# Patient Record
Sex: FEMALE | Race: WHITE | NOT HISPANIC OR LATINO | Employment: OTHER | URBAN - NONMETROPOLITAN AREA
[De-identification: names, ages, dates, MRNs, and addresses within clinical notes are randomized per-mention and may not be internally consistent; named-entity substitution may affect disease eponyms.]

---

## 2017-03-13 RX ORDER — ESCITALOPRAM OXALATE 10 MG/1
10 TABLET ORAL DAILY
Qty: 30 TABLET | Refills: 11 | Status: SHIPPED | OUTPATIENT
Start: 2017-03-13 | End: 2018-03-29 | Stop reason: ALTCHOICE

## 2017-08-02 RX ORDER — PERMETHRIN 50 MG/G
CREAM TOPICAL
Qty: 1 EACH | Refills: 0 | Status: SHIPPED | OUTPATIENT
Start: 2017-08-02 | End: 2018-03-29 | Stop reason: ALTCHOICE

## 2017-08-02 RX ORDER — HYDROXYZINE PAMOATE 25 MG/1
25 CAPSULE ORAL EVERY 6 HOURS PRN
Qty: 60 CAPSULE | Refills: 0 | Status: SHIPPED | OUTPATIENT
Start: 2017-08-02 | End: 2017-08-13 | Stop reason: SDUPTHER

## 2017-08-14 RX ORDER — HYDROXYZINE PAMOATE 25 MG/1
CAPSULE ORAL
Qty: 60 CAPSULE | Refills: 11 | Status: SHIPPED | OUTPATIENT
Start: 2017-08-14 | End: 2018-03-29 | Stop reason: ALTCHOICE

## 2018-03-29 RX ORDER — VERAPAMIL HYDROCHLORIDE 120 MG/1
120 TABLET, FILM COATED ORAL DAILY
COMMUNITY
End: 2022-02-10 | Stop reason: SDUPTHER

## 2018-03-29 RX ORDER — ERGOCALCIFEROL 1.25 MG/1
50000 CAPSULE ORAL
COMMUNITY

## 2018-04-03 ENCOUNTER — ANESTHESIA (OUTPATIENT)
Dept: GASTROENTEROLOGY | Facility: HOSPITAL | Age: 63
End: 2018-04-03

## 2018-04-03 ENCOUNTER — ANESTHESIA EVENT (OUTPATIENT)
Dept: GASTROENTEROLOGY | Facility: HOSPITAL | Age: 63
End: 2018-04-03

## 2018-04-03 ENCOUNTER — HOSPITAL ENCOUNTER (OUTPATIENT)
Facility: HOSPITAL | Age: 63
Setting detail: HOSPITAL OUTPATIENT SURGERY
Discharge: HOME | End: 2018-04-03
Attending: INTERNAL MEDICINE | Admitting: INTERNAL MEDICINE

## 2018-04-03 VITALS
WEIGHT: 154 LBS | DIASTOLIC BLOOD PRESSURE: 70 MMHG | SYSTOLIC BLOOD PRESSURE: 132 MMHG | HEART RATE: 61 BPM | OXYGEN SATURATION: 98 % | BODY MASS INDEX: 26.29 KG/M2 | HEIGHT: 64 IN | RESPIRATION RATE: 18 BRPM | TEMPERATURE: 97.3 F

## 2018-04-03 DIAGNOSIS — Z12.11 ENCOUNTER FOR SCREENING COLONOSCOPY: ICD-10-CM

## 2018-04-03 PROCEDURE — 25010000002 PROPOFOL 10 MG/ML EMULSION: Performed by: NURSE ANESTHETIST, CERTIFIED REGISTERED

## 2018-04-03 PROCEDURE — 88305 TISSUE EXAM BY PATHOLOGIST: CPT | Performed by: PATHOLOGY

## 2018-04-03 PROCEDURE — 88305 TISSUE EXAM BY PATHOLOGIST: CPT | Performed by: INTERNAL MEDICINE

## 2018-04-03 RX ORDER — PROPOFOL 10 MG/ML
VIAL (ML) INTRAVENOUS AS NEEDED
Status: DISCONTINUED | OUTPATIENT
Start: 2018-04-03 | End: 2018-04-03 | Stop reason: SURG

## 2018-04-03 RX ORDER — LIDOCAINE HYDROCHLORIDE 10 MG/ML
INJECTION, SOLUTION INFILTRATION; PERINEURAL AS NEEDED
Status: DISCONTINUED | OUTPATIENT
Start: 2018-04-03 | End: 2018-04-03 | Stop reason: SURG

## 2018-04-03 RX ORDER — ONDANSETRON 2 MG/ML
4 INJECTION INTRAMUSCULAR; INTRAVENOUS ONCE AS NEEDED
Status: DISCONTINUED | OUTPATIENT
Start: 2018-04-03 | End: 2018-04-03 | Stop reason: HOSPADM

## 2018-04-03 RX ORDER — PROMETHAZINE HYDROCHLORIDE 25 MG/1
25 SUPPOSITORY RECTAL ONCE AS NEEDED
Status: DISCONTINUED | OUTPATIENT
Start: 2018-04-03 | End: 2018-04-03 | Stop reason: HOSPADM

## 2018-04-03 RX ORDER — PROMETHAZINE HYDROCHLORIDE 25 MG/1
25 TABLET ORAL ONCE AS NEEDED
Status: DISCONTINUED | OUTPATIENT
Start: 2018-04-03 | End: 2018-04-03 | Stop reason: HOSPADM

## 2018-04-03 RX ORDER — PROMETHAZINE HYDROCHLORIDE 25 MG/ML
12.5 INJECTION, SOLUTION INTRAMUSCULAR; INTRAVENOUS ONCE AS NEEDED
Status: DISCONTINUED | OUTPATIENT
Start: 2018-04-03 | End: 2018-04-03 | Stop reason: HOSPADM

## 2018-04-03 RX ORDER — DEXTROSE AND SODIUM CHLORIDE 5; .45 G/100ML; G/100ML
20 INJECTION, SOLUTION INTRAVENOUS CONTINUOUS
Status: DISCONTINUED | OUTPATIENT
Start: 2018-04-03 | End: 2018-04-03 | Stop reason: HOSPADM

## 2018-04-03 RX ADMIN — DEXTROSE AND SODIUM CHLORIDE 20 ML/HR: 5; 450 INJECTION, SOLUTION INTRAVENOUS at 10:30

## 2018-04-03 RX ADMIN — PROPOFOL 20 MG: 10 INJECTION, EMULSION INTRAVENOUS at 12:15

## 2018-04-03 RX ADMIN — LIDOCAINE HYDROCHLORIDE 50 MG: 10 INJECTION, SOLUTION INFILTRATION; PERINEURAL at 12:05

## 2018-04-03 RX ADMIN — PROPOFOL 30 MG: 10 INJECTION, EMULSION INTRAVENOUS at 12:10

## 2018-04-03 RX ADMIN — PROPOFOL 70 MG: 10 INJECTION, EMULSION INTRAVENOUS at 12:05

## 2018-04-03 RX ADMIN — PROPOFOL 30 MG: 10 INJECTION, EMULSION INTRAVENOUS at 12:08

## 2018-04-03 NOTE — ANESTHESIA POSTPROCEDURE EVALUATION
Patient: Anna Carvalho    Procedure Summary     Date:  04/03/18 Room / Location:  [facility] ENDOSCOPY 2 / [facility] ENDOSCOPY    Anesthesia Start:  1158 Anesthesia Stop:  1223    Procedure:  COLONOSCOPY (N/A ) Diagnosis:       Encounter for screening colonoscopy      (Encounter for screening colonoscopy [Z12.11])    Surgeon:  Jean Morales DO Provider:  Williams Estevez CRNA    Anesthesia Type:  MAC ASA Status:  2          Anesthesia Type: MAC  Last vitals  BP   132/72 (04/03/18 1011)   Temp   97.4 °F (36.3 °C) (04/03/18 1011)   Pulse   75 (04/03/18 1011)   Resp   18 (04/03/18 1011)     SpO2   98 % (04/03/18 1011)     Post Anesthesia Care and Evaluation    Patient location during evaluation: PACU  Patient participation: complete - patient participated  Level of consciousness: awake and alert  Pain score: 1  Pain management: adequate  Airway patency: patent  Anesthetic complications: No anesthetic complications  PONV Status: none  Cardiovascular status: acceptable  Respiratory status: acceptable  Hydration status: acceptable

## 2018-04-03 NOTE — H&P
Adalgisa Nath DO,[facility]  Gastroenterology  Hepatology  Endoscopy  Board Certified in Internal Medicine and gastroenterology  [address], [address]  [place]. [address]  - [phone]   F - [phone]     GASTROENTEROLOGY HISTORY AND PHYSICAL  NOTE   ADALGISA NATH DO.         SUBJECTIVE:   4/3/2018    Name: Anna Carvalho  DOD: 1955      Chief Complaint:       Subjective : Screening examination for routine risk for colon cancer    Patient is 62 y.o. female presents with desire for elective colonoscopy.      ROS/HISTORY/ CURRENT MEDICATIONS/OBJECTIVE/VS/PE:   Review of Systems:   Review of Systems    History:     Past Medical History:   Diagnosis Date   • Migraine    • Sleep apnea      Past Surgical History:   Procedure Laterality Date   • APPENDECTOMY     • CHOLECYSTECTOMY     • HYSTERECTOMY       History reviewed. No pertinent family history.  Social History   Substance Use Topics   • Smoking status: Current Some Day Smoker     Packs/day: 1.00   • Smokeless tobacco: Never Used   • Alcohol use No     Prescriptions Prior to Admission   Medication Sig Dispense Refill Last Dose   • verapamil (CALAN) 120 MG tablet Take 120 mg by mouth Daily.   4/3/2018 at Unknown time   • vitamin D (ERGOCALCIFEROL) 07911 units capsule capsule Take 50,000 Units by mouth Every 14 (Fourteen) Days.   3/18/2018     Allergies:  Biaxin [clarithromycin]; Penicillins; Ceclor [cefaclor]; Keflex [cephalexin]; Other; and Reglan [metoclopramide]    I have reviewed the patients medical history, surgical history and family history in the available medical record system.     Current Medications:     Current Facility-Administered Medications   Medication Dose Route Frequency Provider Last Rate Last Dose   • dextrose 5 % and sodium chloride 0.45 % infusion  20 mL/hr Intravenous Continuous Adalgisa Nath DO 20 mL/hr at 04/03/18 1030 20 mL/hr at 04/03/18 1030       Objective     Physical Exam:   Temp:  [97.4 °F (36.3  °C)] 97.4 °F (36.3 °C)  Heart Rate:  [75] 75  Resp:  [18] 18  BP: (132)/(72) 132/72    Physical Exam:  General Appearance:    Alert, cooperative, in no acute distress   Head:    Normocephalic, without obvious abnormality, atraumatic   Eyes:            Lids and lashes normal, conjunctivae and sclerae normal, no   icterus, no pallor, corneas clear, PERRLA   Ears:    Ears appear intact with no abnormalities noted   Throat:   No oral lesions, no thrush, oral mucosa moist   Neck:   No adenopathy, supple, trachea midline, no thyromegaly, no     carotid bruit, no JVD   Back:     No kyphosis present, no scoliosis present, no skin lesions,       erythema or scars, no tenderness to percussion or                   palpation,   range of motion normal   Lungs:     Clear to auscultation,respirations regular, even and                   unlabored    Heart:    Regular rhythm and normal rate, normal S1 and S2, no            murmur, no gallop, no rub, no click   Breast Exam:    Deferred   Abdomen:     Normal bowel sounds, no masses, no organomegaly, soft        non-tender, non-distended, no guarding, no rebound                 tenderness   Genitalia:    Deferred   Extremities:   Moves all extremities well, no edema, no cyanosis, no              redness   Pulses:   Pulses palpable and equal bilaterally   Skin:   No bleeding, bruising or rash   Lymph nodes:   No palpable adenopathy   Neurologic:   Cranial nerves 2 - 12 grossly intact, sensation intact, DTR        present and equal bilaterally      Results Review:     No results found for: WBC, HGB, HCT, PLT          No results found for: LIPASE  No results found for: INR       Radiology Review:  Imaging Results (last 72 hours)     ** No results found for the last 72 hours. **           I reviewed the patient's new clinical results.  I reviewed the patient's new imaging results and agree with the interpretation.     ASSESSMENT/PLAN:   ASSESSMENT:   1.  Screening examination for routine  risk for colon cancer    PLAN:   1.  Colonoscopy    Risk and benefits associated with procedure are reviewed with the patient.  The patient wishes to proceed      Jean Morales DO  04/03/18  11:47 AM

## 2018-04-03 NOTE — ANESTHESIA PREPROCEDURE EVALUATION
Anesthesia Evaluation     NPO Solid Status: > 8 hours  NPO Liquid Status: > 8 hours           Airway   No difficulty expected  Dental      Pulmonary - normal exam   (+) a smoker Current Smoked day of surgery, COPD mild, sleep apnea,   Cardiovascular - negative cardio ROS and normal exam        Neuro/Psych  (+) headaches,     GI/Hepatic/Renal/Endo - negative ROS     Musculoskeletal (-) negative ROS    Abdominal  - normal exam   Substance History - negative use     OB/GYN negative ob/gyn ROS         Other - negative ROS                     Anesthesia Plan    ASA 2     MAC     intravenous induction   Anesthetic plan and risks discussed with patient.    Plan discussed with CRNA.

## 2018-04-04 LAB
LAB AP CASE REPORT: NORMAL
Lab: NORMAL
PATH REPORT.FINAL DX SPEC: NORMAL
PATH REPORT.GROSS SPEC: NORMAL

## 2018-04-27 ENCOUNTER — HOSPITAL ENCOUNTER (EMERGENCY)
Facility: HOSPITAL | Age: 63
Discharge: HOME | End: 2018-04-27
Attending: EMERGENCY MEDICINE | Admitting: EMERGENCY MEDICINE

## 2018-04-27 ENCOUNTER — APPOINTMENT (OUTPATIENT)
Dept: CT IMAGING | Facility: HOSPITAL | Age: 63
End: 2018-04-27

## 2018-04-27 VITALS
TEMPERATURE: 98 F | WEIGHT: 157 LBS | BODY MASS INDEX: 26.8 KG/M2 | HEART RATE: 64 BPM | DIASTOLIC BLOOD PRESSURE: 62 MMHG | SYSTOLIC BLOOD PRESSURE: 125 MMHG | HEIGHT: 64 IN | RESPIRATION RATE: 18 BRPM | OXYGEN SATURATION: 96 %

## 2018-04-27 DIAGNOSIS — R10.9 FLANK PAIN, ACUTE: Primary | ICD-10-CM

## 2018-04-27 LAB
ALBUMIN SERPL-MCNC: 4.1 G/DL (ref 3.4–4.8)
ALBUMIN/GLOB SERPL: 1.4 G/DL (ref 1.1–1.8)
ALP SERPL-CCNC: 107 U/L (ref 38–126)
ALT SERPL W P-5'-P-CCNC: 21 U/L (ref 9–52)
ANION GAP SERPL CALCULATED.3IONS-SCNC: 11 MMOL/L (ref 5–15)
AST SERPL-CCNC: 15 U/L (ref 14–36)
BASOPHILS # BLD AUTO: 0.03 10*3/MM3 (ref 0–0.2)
BASOPHILS NFR BLD AUTO: 0.3 % (ref 0–2)
BILIRUB SERPL-MCNC: 0.5 MG/DL (ref 0.2–1.3)
BILIRUB UR QL STRIP: NEGATIVE
BUN BLD-MCNC: 10 MG/DL (ref 7–21)
BUN/CREAT SERPL: 13.3 (ref 7–25)
CALCIUM SPEC-SCNC: 9 MG/DL (ref 8.4–10.2)
CHLORIDE SERPL-SCNC: 104 MMOL/L (ref 95–110)
CLARITY UR: CLEAR
CO2 SERPL-SCNC: 24 MMOL/L (ref 22–31)
COLOR UR: YELLOW
CREAT BLD-MCNC: 0.75 MG/DL (ref 0.5–1)
DEPRECATED RDW RBC AUTO: 42.4 FL (ref 36.4–46.3)
EOSINOPHIL # BLD AUTO: 0.07 10*3/MM3 (ref 0–0.7)
EOSINOPHIL NFR BLD AUTO: 0.7 % (ref 0–7)
ERYTHROCYTE [DISTWIDTH] IN BLOOD BY AUTOMATED COUNT: 13.4 % (ref 11.5–14.5)
GFR SERPL CREATININE-BSD FRML MDRD: 79 ML/MIN/1.73 (ref 45–104)
GLOBULIN UR ELPH-MCNC: 3 GM/DL (ref 2.3–3.5)
GLUCOSE BLD-MCNC: 89 MG/DL (ref 60–100)
GLUCOSE UR STRIP-MCNC: NEGATIVE MG/DL
HCT VFR BLD AUTO: 44.9 % (ref 35–45)
HGB BLD-MCNC: 15.5 G/DL (ref 12–15.5)
HGB UR QL STRIP.AUTO: NEGATIVE
HOLD SPECIMEN: NORMAL
HOLD SPECIMEN: NORMAL
IMM GRANULOCYTES # BLD: 0.03 10*3/MM3 (ref 0–0.02)
IMM GRANULOCYTES NFR BLD: 0.3 % (ref 0–0.5)
KETONES UR QL STRIP: NEGATIVE
LEUKOCYTE ESTERASE UR QL STRIP.AUTO: NEGATIVE
LIPASE SERPL-CCNC: 84 U/L (ref 23–300)
LYMPHOCYTES # BLD AUTO: 3.56 10*3/MM3 (ref 0.6–4.2)
LYMPHOCYTES NFR BLD AUTO: 34.5 % (ref 10–50)
MCH RBC QN AUTO: 29.9 PG (ref 26.5–34)
MCHC RBC AUTO-ENTMCNC: 34.5 G/DL (ref 31.4–36)
MCV RBC AUTO: 86.5 FL (ref 80–98)
MONOCYTES # BLD AUTO: 0.74 10*3/MM3 (ref 0–0.9)
MONOCYTES NFR BLD AUTO: 7.2 % (ref 0–12)
NEUTROPHILS # BLD AUTO: 5.88 10*3/MM3 (ref 2–8.6)
NEUTROPHILS NFR BLD AUTO: 57 % (ref 37–80)
NITRITE UR QL STRIP: NEGATIVE
PH UR STRIP.AUTO: 6.5 [PH] (ref 5–9)
PLATELET # BLD AUTO: 232 10*3/MM3 (ref 150–450)
PMV BLD AUTO: 11.7 FL (ref 8–12)
POTASSIUM BLD-SCNC: 3.7 MMOL/L (ref 3.5–5.1)
PROT SERPL-MCNC: 7.1 G/DL (ref 6.3–8.6)
PROT UR QL STRIP: NEGATIVE
RBC # BLD AUTO: 5.19 10*6/MM3 (ref 3.77–5.16)
SODIUM BLD-SCNC: 139 MMOL/L (ref 137–145)
SP GR UR STRIP: 1.01 (ref 1–1.03)
UROBILINOGEN UR QL STRIP: NORMAL
WBC NRBC COR # BLD: 10.31 10*3/MM3 (ref 3.2–9.8)
WHOLE BLOOD HOLD SPECIMEN: NORMAL
WHOLE BLOOD HOLD SPECIMEN: NORMAL

## 2018-04-27 PROCEDURE — 74176 CT ABD & PELVIS W/O CONTRAST: CPT

## 2018-04-27 PROCEDURE — 96374 THER/PROPH/DIAG INJ IV PUSH: CPT

## 2018-04-27 PROCEDURE — 96361 HYDRATE IV INFUSION ADD-ON: CPT

## 2018-04-27 PROCEDURE — 83690 ASSAY OF LIPASE: CPT

## 2018-04-27 PROCEDURE — 25010000002 MORPHINE PER 10 MG: Performed by: EMERGENCY MEDICINE

## 2018-04-27 PROCEDURE — 25010000002 KETOROLAC TROMETHAMINE PER 15 MG: Performed by: EMERGENCY MEDICINE

## 2018-04-27 PROCEDURE — 99284 EMERGENCY DEPT VISIT MOD MDM: CPT

## 2018-04-27 PROCEDURE — 25010000002 ONDANSETRON PER 1 MG: Performed by: EMERGENCY MEDICINE

## 2018-04-27 PROCEDURE — 85025 COMPLETE CBC W/AUTO DIFF WBC: CPT

## 2018-04-27 PROCEDURE — 96375 TX/PRO/DX INJ NEW DRUG ADDON: CPT

## 2018-04-27 PROCEDURE — 80053 COMPREHEN METABOLIC PANEL: CPT

## 2018-04-27 PROCEDURE — 81003 URINALYSIS AUTO W/O SCOPE: CPT

## 2018-04-27 RX ORDER — SODIUM CHLORIDE 0.9 % (FLUSH) 0.9 %
10 SYRINGE (ML) INJECTION AS NEEDED
Status: DISCONTINUED | OUTPATIENT
Start: 2018-04-27 | End: 2018-04-27 | Stop reason: HOSPADM

## 2018-04-27 RX ORDER — SODIUM CHLORIDE 9 MG/ML
125 INJECTION, SOLUTION INTRAVENOUS CONTINUOUS
Status: DISCONTINUED | OUTPATIENT
Start: 2018-04-27 | End: 2018-04-27 | Stop reason: HOSPADM

## 2018-04-27 RX ORDER — NAPROXEN 500 MG/1
500 TABLET ORAL 2 TIMES DAILY PRN
Qty: 15 TABLET | Refills: 0 | Status: SHIPPED | OUTPATIENT
Start: 2018-04-27 | End: 2021-09-23

## 2018-04-27 RX ORDER — ORPHENADRINE CITRATE 100 MG/1
100 TABLET, EXTENDED RELEASE ORAL 2 TIMES DAILY PRN
Qty: 15 TABLET | Refills: 0 | Status: SHIPPED | OUTPATIENT
Start: 2018-04-27 | End: 2020-10-02

## 2018-04-27 RX ORDER — ONDANSETRON 2 MG/ML
4 INJECTION INTRAMUSCULAR; INTRAVENOUS ONCE
Status: COMPLETED | OUTPATIENT
Start: 2018-04-27 | End: 2018-04-27

## 2018-04-27 RX ORDER — BUDESONIDE AND FORMOTEROL FUMARATE DIHYDRATE 160; 4.5 UG/1; UG/1
2 AEROSOL RESPIRATORY (INHALATION)
COMMUNITY
End: 2021-06-07 | Stop reason: SDUPTHER

## 2018-04-27 RX ORDER — KETOROLAC TROMETHAMINE 30 MG/ML
30 INJECTION, SOLUTION INTRAMUSCULAR; INTRAVENOUS ONCE
Status: COMPLETED | OUTPATIENT
Start: 2018-04-27 | End: 2018-04-27

## 2018-04-27 RX ADMIN — SODIUM CHLORIDE 125 ML/HR: 9 INJECTION, SOLUTION INTRAVENOUS at 11:47

## 2018-04-27 RX ADMIN — ONDANSETRON 4 MG: 2 INJECTION INTRAMUSCULAR; INTRAVENOUS at 11:52

## 2018-04-27 RX ADMIN — Medication 10 ML: at 11:40

## 2018-04-27 RX ADMIN — KETOROLAC TROMETHAMINE 30 MG: 30 INJECTION, SOLUTION INTRAMUSCULAR; INTRAVENOUS at 11:48

## 2018-04-27 RX ADMIN — MORPHINE SULFATE 4 MG: 4 INJECTION, SOLUTION INTRAMUSCULAR; INTRAVENOUS at 11:54

## 2020-07-01 RX ORDER — HYDROXYZINE PAMOATE 25 MG/1
25 CAPSULE ORAL EVERY 6 HOURS PRN
Qty: 60 CAPSULE | Refills: 1 | Status: SHIPPED | OUTPATIENT
Start: 2020-07-01

## 2020-07-01 RX ORDER — CITALOPRAM 10 MG/1
10 TABLET ORAL NIGHTLY
Qty: 90 TABLET | Refills: 3 | Status: SHIPPED | OUTPATIENT
Start: 2020-07-01 | End: 2020-10-02

## 2020-09-25 ENCOUNTER — OFFICE VISIT (OUTPATIENT)
Dept: CARDIOLOGY | Facility: CLINIC | Age: 65
End: 2020-09-25

## 2020-09-25 VITALS
SYSTOLIC BLOOD PRESSURE: 142 MMHG | DIASTOLIC BLOOD PRESSURE: 80 MMHG | WEIGHT: 160.2 LBS | BODY MASS INDEX: 27.35 KG/M2 | HEART RATE: 81 BPM | HEIGHT: 64 IN | OXYGEN SATURATION: 98 %

## 2020-09-25 DIAGNOSIS — F17.200 TOBACCO DEPENDENCE: ICD-10-CM

## 2020-09-25 DIAGNOSIS — R07.2 PRECORDIAL PAIN: Primary | ICD-10-CM

## 2020-09-25 DIAGNOSIS — Z82.79 FAMILY HISTORY OF FIRST DEGREE RELATIVE WITH BICUSPID AORTIC VALVE: ICD-10-CM

## 2020-09-25 PROCEDURE — 93000 ELECTROCARDIOGRAM COMPLETE: CPT | Performed by: INTERNAL MEDICINE

## 2020-09-25 PROCEDURE — 99214 OFFICE O/P EST MOD 30 MIN: CPT | Performed by: NURSE PRACTITIONER

## 2020-09-25 NOTE — PROGRESS NOTES
Chest Pain (chief complaint)      History of Present Illness     Mrs. Anna Carvalho is a 65-year-old  female with past medical history of migraines (chronic, controlled with Norflex as needed, verapamil 120 mg daily), sleep apnea (chronic, managed with CPAP reports compliance), anxiety/depression (chronic, managed with Celexa 10 mg nightly, Vistaril 25 mg as needed), vitamin D deficiency (chronic, managed with 50,000 units every 2 weeks) who presents today for evaluation of shortness of breath and intermittent chest pain.  She has noticed few instances of intermittent chest pain.  Once or twice at rest. 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 Described as discomfort/pressure.  Denies radiation.  Associated symptoms of shortness of breath.      Patient reports previously having cardiac stress test, tilt table, and echocardiogram many years ago after having a syncopal episode.  Reports this was normal and that her symptoms were related to her migraines with aura.  As well, her Heatherly sees her son he was found to have a bicuspid aortic valve and recommended that first-degree relatives get checked.    The ASCVD Risk score (Justino DC Jr., et al., 2013) failed to calculate for the following reasons:    Cannot find a previous HDL lab    Cannot find a previous total cholesterol lab      Past Medical History:   Diagnosis Date   • Migraine    • Sleep apnea      Past Surgical History:   Procedure Laterality Date   • APPENDECTOMY     • CHOLECYSTECTOMY     • COLONOSCOPY N/A 4/3/2018    Procedure: COLONOSCOPY;  Surgeon: Jean Morales DO;  Location: [facility] ENDOSCOPY;  Service: Gastroenterology   • HYSTERECTOMY       Social History     Socioeconomic History   • Marital status:      Spouse name: Not on file   • Number of children: Not on file   • Years of education: Not on file   • Highest education level: Not on file   Tobacco Use   • Smoking status: Current Every Day Smoker     Packs/day: 1.00     Types: Cigarettes    • Smokeless tobacco: Never Used   Substance and Sexual Activity   • Alcohol use: No   • Drug use: No   • Sexual activity: Defer     History reviewed. No pertinent family history.    ALLERGIES:  Allergies   Allergen Reactions   • Biaxin [Clarithromycin] GI Intolerance   • Penicillins Other (See Comments)     childhood   • Ceclor [Cefaclor] Rash   • Keflex [Cephalexin] Rash   • Other Rash     ivp dye   • Reglan [Metoclopramide] Rash         Review of Systems   Constitution: Negative for chills, fever, malaise/fatigue and weight gain.   HENT: Negative for nosebleeds and tinnitus.    Eyes: Negative for blurred vision and double vision.   Cardiovascular: Positive for chest pain and dyspnea on exertion. Negative for irregular heartbeat, leg swelling, palpitations and syncope.   Respiratory: Negative for cough, shortness of breath, sleep disturbances due to breathing and snoring.    Endocrine: Negative for polydipsia, polyphagia and polyuria.   Hematologic/Lymphatic: Negative for bleeding problem. Does not bruise/bleed easily.   Skin: Negative for color change and suspicious lesions.   Musculoskeletal: Negative for falls and myalgias.   Gastrointestinal: Negative for bloating, heartburn and hematochezia.   Genitourinary: Negative for dysuria and hematuria.   Neurological: Negative for dizziness, headaches, seizures, vertigo and weakness.   Psychiatric/Behavioral: Negative for altered mental status and depression. The patient does not have insomnia and is not nervous/anxious.    Allergic/Immunologic: Negative for environmental allergies and persistent infections.       Current Outpatient Medications   Medication Sig Dispense Refill   • budesonide-formoterol (SYMBICORT) 160-4.5 MCG/ACT inhaler Inhale 2 puffs 2 (Two) Times a Day.     • hydrOXYzine pamoate (Vistaril) 25 MG capsule Take 1 capsule by mouth Every 6 (Six) Hours As Needed for Anxiety. 60 capsule 1   • orphenadrine (NORFLEX) 100 MG 12 hr tablet Take 1 tablet by  mouth 2 (Two) Times a Day As Needed (back pain, spasm). 15 tablet 0   • verapamil (CALAN) 120 MG tablet Take 120 mg by mouth Daily.     • vitamin D (ERGOCALCIFEROL) 82453 units capsule capsule Take 50,000 Units by mouth Every 14 (Fourteen) Days.     • citalopram (CeleXA) 10 MG tablet Take 1 tablet by mouth Every Night. 90 tablet 3   • naproxen (NAPROSYN) 500 MG tablet Take 1 tablet by mouth 2 (Two) Times a Day As Needed for Mild Pain  or Moderate Pain . 15 tablet 0     No current facility-administered medications for this visit.        OBJECTIVE:    Physical Exam:   Vitals signs reviewed.   Constitutional:       General: Not in acute distress.     Appearance: Normal appearance. Well-developed. Not toxic-appearing or diaphoretic.   Eyes:      General: Lids are normal.      Conjunctiva/sclera: Conjunctivae normal.   HENT:      Head: Normocephalic and atraumatic.      Right Ear: External ear normal.      Left Ear: External ear normal.   Neck:      Musculoskeletal: Normal range of motion and neck supple.      Vascular: No JVD.   Pulmonary:      Effort: Pulmonary effort is normal. No respiratory distress.      Breath sounds: Normal breath sounds. No decreased breath sounds. No wheezing. No rales.   Chest:      Chest wall: Not tender to palpatation.   Cardiovascular:      PMI at left midclavicular line. Normal rate. Regular rhythm. Normal S1. Normal S2.      Murmurs: There is no murmur.      No gallop. No S3 and S4 gallop. No click. No rub.   Pulses:     Intact distal pulses. No decreased pulses.   Edema:     Peripheral edema absent.   Abdominal:      General: Bowel sounds are normal. There is no distension.      Palpations: Abdomen is soft.      Tenderness: There is no abdominal tenderness.   Skin:     General: Skin is warm and dry.      Coloration: Skin is not pale.      Findings: No erythema or rash.   Neurological:      Mental Status: Alert and oriented to person, place, and time.      Gait: Gait normal.    "  Psychiatric:         Behavior: Behavior normal.         Thought Content: Thought content normal.         Judgment: Judgment normal.       Vitals:    09/25/20 0933   BP: 142/80   BP Location: Left arm   Patient Position: Sitting   Cuff Size: Adult   Pulse: 81   SpO2: 98%   Weight: 72.7 kg (160 lb 3.2 oz)   Height: 162.6 cm (64\")       DATA REVIEWED:        No radiology results for the last 30 days.  CXR:         CT:     Labs: BMP, CBC, LIPID, TSH  Lab Results   Component Value Date    GLUCOSE 89 04/27/2018    CALCIUM 9.3 08/17/2018     08/17/2018    K 3.9 08/17/2018    CO2 25 08/17/2018     08/17/2018    BUN 9 08/17/2018    CREATININE 0.8 08/17/2018    EGFRIFAFRI 88 08/17/2018    EGFRIFNONA 79 04/27/2018    BCR 13.3 04/27/2018    ANIONGAP 10 08/17/2018     Lab Results   Component Value Date    WBC 13.4 (H) 08/17/2018    HGB 16.5 (H) 08/17/2018    HCT 48.4 (H) 08/17/2018    MCV 88.6 08/17/2018     08/17/2018     No results found for: CHOL  No results found for: TRIG  No results found for: HDL  No components found for: LDLCALC  No results found for: LDL  No results found for: HDLLDLRATIO  No components found for: CHOLHDL  No results found for: TSH  No results found for: PROBNP  EKG:       TTE:     Stress tests:     C:      The following portions of the patient's history were reviewed and updated as appropriate: allergies, current medications, past family history, past medical history, past social history, past surgical history and problem list.  Old records reviewed and pertinent information is included in the above objective data.     ASSESSMENT/PLAN:       Diagnosis Plan   1. Precordial pain  ECG 12 Lead    Adult Transthoracic Echo Complete W/ Cont if Necessary Per Protocol    Stress Test With Myocardial Perfusion One Day    Chest pain syndrome. Pain characteristic: atypical angina   Patient is Low-moderate.     Ischemic evaluation:ordered.   The patient is not able to exercise. COPD  EKG is " Normal  Risks/Benefits discussed  Ischemia evaluation with Nuclear Stress Test. Pt has allergy to dye.   TTE to assess for structural heart disease and further evaluate for valvular disease due to family hx of BAV.  Basic Labs, PA/LA CXR (results reviewed if completed)       2. Tobacco dependence  Anna Carvalho  reports that she has been smoking cigarettes. She has been smoking about 1.00 pack per day. She has never used smokeless tobacco.. I have educated her on the risk of diseases from using tobacco products such as cancer, COPD and heart diease.     I advised her to quit and she is not willing to quit.    I spent 3  minutes counseling the patient.          3. Family history of first degree relative with bicuspid aortic valve  Son with hx of BAV. TTE ordered to assess.        Follow up: 4-6 weeks.             This document has been electronically signed by ALINE Escamilla on September 25, 2020 11:36 CDT

## 2020-09-29 ENCOUNTER — OFFICE VISIT (OUTPATIENT)
Dept: OTOLARYNGOLOGY | Facility: CLINIC | Age: 65
End: 2020-09-29

## 2020-09-29 ENCOUNTER — CLINICAL SUPPORT (OUTPATIENT)
Dept: AUDIOLOGY | Facility: CLINIC | Age: 65
End: 2020-09-29

## 2020-09-29 VITALS
OXYGEN SATURATION: 97 % | WEIGHT: 164 LBS | BODY MASS INDEX: 28 KG/M2 | HEIGHT: 64 IN | DIASTOLIC BLOOD PRESSURE: 98 MMHG | TEMPERATURE: 98.2 F | SYSTOLIC BLOOD PRESSURE: 138 MMHG

## 2020-09-29 DIAGNOSIS — H90.3 SENSORINEURAL HEARING LOSS OF BOTH EARS: ICD-10-CM

## 2020-09-29 DIAGNOSIS — H60.63 CHRONIC NON-INFECTIVE OTITIS EXTERNA OF BOTH EARS, UNSPECIFIED TYPE: ICD-10-CM

## 2020-09-29 DIAGNOSIS — H90.3 SENSORINEURAL HEARING LOSS, BILATERAL: Primary | ICD-10-CM

## 2020-09-29 DIAGNOSIS — G43.809 VESTIBULAR MIGRAINE: Primary | ICD-10-CM

## 2020-09-29 DIAGNOSIS — H92.02 LEFT EAR PAIN: ICD-10-CM

## 2020-09-29 DIAGNOSIS — R42 DIZZINESS: ICD-10-CM

## 2020-09-29 PROCEDURE — 99203 OFFICE O/P NEW LOW 30 MIN: CPT | Performed by: OTOLARYNGOLOGY

## 2020-09-29 PROCEDURE — 92567 TYMPANOMETRY: CPT | Performed by: AUDIOLOGIST

## 2020-09-29 PROCEDURE — 92557 COMPREHENSIVE HEARING TEST: CPT | Performed by: AUDIOLOGIST

## 2020-09-29 NOTE — PROGRESS NOTES
STANDARD AUDIOMETRIC EVALUATION      Name:  Anna Carvalho  :  1955  Age:  65 y.o.  Date of Evaluation:  2020      HISTORY    Reason for visit:  Anna Carvalho is seen today for a hearing test at the request of Dr. Mukund Baptiste.  Patient reports she has ear pain and itching.  She states she gets dizzy, and she had occasional ringing in her ears.  She states she can't hear as well in her left ear.       EVALUATION    See Audiogram    RESULTS        Otoscopy and Tympanometry 226 Hz :  Right Ear:  Otoscopy:  Testing completed after ears were examined by the ENT physician          Tympanometry:  Middle ear function within normal limits    Left Ear:   Otoscopy:  Testing completed after ears were examined by the ENT physician        Tympanometry:  Middle ear function within normal limits    Test technique:  Standard Audiometry     Pure Tone Audiometry:   Patient responded to pure tones at 5-35 dB for 250-8000 Hz in right ear, and at 5-40 dB for 250-8000 Hz in left ear.       Speech Audiometry:        Right Ear:  Speech Reception Threshold (SRT) was obtained at 15 dBHL                 Speech Discrimination scores were 100% in quiet when words were presented at 50 dBHL       Left Ear:  Speech Reception Threshold (SRT) was obtained at 15 dBHL                 Speech Discrimination scores were 100% in quiet when words were presented at 55 dBHL    Reliability:   good    IMPRESSIONS:  1.  Tympanometry results are consistent with Middle ear function within normal limits in both ears.  2.  Pure tone results are consistent with within normal limits to mild high frequency sensorineural hearing loss for both ears.       RECOMMENDATIONS:  Patient is seeing the Ear Nose and Throat physician immediately following this examination.  It was a pleasure seeing Anna Carvalho in Audiology today.  We would be happy to do further testing or discuss these test as necessary.          This document has been  electronically signed by Keyonna Gonzalez MS CCC-THU on September 29, 2020 10:35 CDT       Keyonna Gonzalez MS CCC-THU  Licensed Audiologist

## 2020-10-02 RX ORDER — CLOTRIMAZOLE AND BETAMETHASONE DIPROPIONATE 10; .5 MG/ML; MG/ML
LOTION TOPICAL
Qty: 30 ML | Refills: 2 | Status: SHIPPED | OUTPATIENT
Start: 2020-10-02

## 2020-10-02 RX ORDER — MECLIZINE HYDROCHLORIDE 25 MG/1
25 TABLET ORAL 3 TIMES DAILY PRN
Qty: 30 TABLET | Refills: 2 | Status: SHIPPED | OUTPATIENT
Start: 2020-10-02

## 2020-10-26 ENCOUNTER — HOSPITAL ENCOUNTER (OUTPATIENT)
Dept: NUCLEAR MEDICINE | Facility: HOSPITAL | Age: 65
Discharge: HOME | End: 2020-10-26

## 2020-10-26 ENCOUNTER — HOSPITAL ENCOUNTER (OUTPATIENT)
Dept: CARDIOLOGY | Facility: HOSPITAL | Age: 65
Discharge: HOME | End: 2020-10-26

## 2020-10-26 DIAGNOSIS — R07.2 PRECORDIAL PAIN: ICD-10-CM

## 2020-10-26 LAB
BH CV STRESS BP STAGE 1: NORMAL
BH CV STRESS COMMENTS STAGE 1: NORMAL
BH CV STRESS DOSE REGADENOSON STAGE 1: 0.4
BH CV STRESS DURATION MIN STAGE 1: 0
BH CV STRESS DURATION SEC STAGE 1: 10
BH CV STRESS HR STAGE 1: 92
BH CV STRESS PROTOCOL 1: NORMAL
BH CV STRESS RECOVERY BP: NORMAL MMHG
BH CV STRESS RECOVERY HR: 88 BPM
BH CV STRESS STAGE 1: 1
LV EF NUC BP: 80 %
MAXIMAL PREDICTED HEART RATE: 156 BPM
PERCENT MAX PREDICTED HR: 64.74 %
STRESS BASELINE BP: NORMAL MMHG
STRESS BASELINE HR: 82 BPM
STRESS PERCENT HR: 76 %
STRESS POST ESTIMATED WORKLOAD: 1 METS
STRESS POST PEAK BP: NORMAL MMHG
STRESS POST PEAK HR: 101 BPM
STRESS TARGET HR: 133 BPM

## 2020-10-26 PROCEDURE — A9500 TC99M SESTAMIBI: HCPCS | Performed by: NURSE PRACTITIONER

## 2020-10-26 PROCEDURE — 0 TECHNETIUM SESTAMIBI: Performed by: NURSE PRACTITIONER

## 2020-10-26 PROCEDURE — 93018 CV STRESS TEST I&R ONLY: CPT | Performed by: INTERNAL MEDICINE

## 2020-10-26 PROCEDURE — 78452 HT MUSCLE IMAGE SPECT MULT: CPT | Performed by: INTERNAL MEDICINE

## 2020-10-26 PROCEDURE — 93016 CV STRESS TEST SUPVJ ONLY: CPT | Performed by: INTERNAL MEDICINE

## 2020-10-26 PROCEDURE — 78452 HT MUSCLE IMAGE SPECT MULT: CPT

## 2020-10-26 PROCEDURE — 93017 CV STRESS TEST TRACING ONLY: CPT

## 2020-10-26 PROCEDURE — 25010000002 REGADENOSON 0.4 MG/5ML SOLUTION: Performed by: NURSE PRACTITIONER

## 2020-10-26 RX ORDER — SODIUM CHLORIDE 0.9 % (FLUSH) 0.9 %
10 SYRINGE (ML) INJECTION ONCE
Status: COMPLETED | OUTPATIENT
Start: 2020-10-26 | End: 2020-10-26

## 2020-10-26 RX ADMIN — SODIUM CHLORIDE, PRESERVATIVE FREE 10 ML: 5 INJECTION INTRAVENOUS at 08:20

## 2020-10-26 RX ADMIN — REGADENOSON 0.4 MG: 0.08 INJECTION, SOLUTION INTRAVENOUS at 08:19

## 2020-10-26 RX ADMIN — TECHNETIUM TC 99M SESTAMIBI 1 DOSE: 1 INJECTION INTRAVENOUS at 07:21

## 2020-10-26 RX ADMIN — TECHNETIUM TC 99M SESTAMIBI 1 DOSE: 1 INJECTION INTRAVENOUS at 08:20

## 2020-11-02 ENCOUNTER — OFFICE VISIT (OUTPATIENT)
Dept: CARDIOLOGY | Facility: CLINIC | Age: 65
End: 2020-11-02

## 2020-11-02 DIAGNOSIS — F17.200 TOBACCO DEPENDENCE: ICD-10-CM

## 2020-11-02 DIAGNOSIS — R07.2 PRECORDIAL PAIN: Primary | ICD-10-CM

## 2020-11-02 DIAGNOSIS — Z82.79 FAMILY HISTORY OF FIRST DEGREE RELATIVE WITH BICUSPID AORTIC VALVE: ICD-10-CM

## 2020-11-02 DIAGNOSIS — J44.1 COPD WITH ACUTE EXACERBATION (HCC): ICD-10-CM

## 2020-11-02 PROCEDURE — 99214 OFFICE O/P EST MOD 30 MIN: CPT | Performed by: NURSE PRACTITIONER

## 2020-11-02 RX ORDER — BUDESONIDE AND FORMOTEROL FUMARATE DIHYDRATE 160; 4.5 UG/1; UG/1
2 AEROSOL RESPIRATORY (INHALATION)
Qty: 1 INHALER | Refills: 5 | Status: SHIPPED | OUTPATIENT
Start: 2020-11-02 | End: 2020-12-03

## 2020-11-02 RX ORDER — ALBUTEROL SULFATE 90 UG/1
2 AEROSOL, METERED RESPIRATORY (INHALATION) EVERY 4 HOURS PRN
Qty: 18 G | Refills: 5 | Status: SHIPPED | OUTPATIENT
Start: 2020-11-02 | End: 2022-02-10 | Stop reason: SDUPTHER

## 2020-11-02 RX ORDER — METHYLPREDNISOLONE 4 MG/1
TABLET ORAL
Qty: 21 TABLET | Refills: 0 | Status: SHIPPED | OUTPATIENT
Start: 2020-11-02 | End: 2020-12-03

## 2020-11-02 NOTE — PROGRESS NOTES
Chest Pain and Follow-up      History of Present Illness     Mrs. Anna Carvalho is a 65-year-old  female with past medical history of migraines (chronic, controlled with Norflex as needed, verapamil 120 mg daily), sleep apnea (chronic, managed with CPAP reports compliance), anxiety/depression (chronic, managed with Celexa 10 mg nightly, Vistaril 25 mg as needed), vitamin D deficiency (chronic, managed with 50,000 units every 2 weeks) who presents today for evaluation of shortness of breath and intermittent chest pain.  She has noticed few instances of intermittent chest pain.  Once or twice at rest. 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 Described as discomfort/pressure.  Denies radiation.  Associated symptoms of shortness of breath.      Patient reports previously having cardiac stress test, tilt table, and echocardiogram many years ago after having a syncopal episode.  Reports this was normal and that her symptoms were related to her migraines with aura.  As well, her Heatherly sees her son he was found to have a bicuspid aortic valve and recommended that first-degree relatives get checked.    11/2/2020: Patient presents as a follow-up for above complaints and review of test results. Denies further episodes of chest pain. Nuclear stress test showing no evidence of Lexiscan induced ischemia.  Normal myocardial perfusion study.  Low risk study.   Given history of first-degree relative with bicuspid aortic valve (BAV) echocardiogram was ordered. Echocardiogram showing normal biventricular function with LVEF at 61 to 65%, grade 1 DD.  There appears to be no evidence of BAV.  No significant valvular abnormalities noted.  Patient does report since having Christine scan she feels as if her breathing has worsened, notes a cough, pain in her back when breathing.  Patient does have history of COPD this is currently only managed with Symbicort.  Added Medrol Dosepak, take as directed.  Added albuterol inhaler.  Referral  placed to get back in with pulmonology.  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    The 10-year ASCVD risk score (Justino GARAY Jr., et al., 2013) is: 11%    Values used to calculate the score:      Age: 65 years      Sex: Female      Is Non- : No      Diabetic: No      Tobacco smoker: Yes      Systolic Blood Pressure: 128 mmHg      Is BP treated: No      HDL Cholesterol: 46 mg/dL      Total Cholesterol: 229 mg/dL      Past Medical History:   Diagnosis Date   • COPD (chronic obstructive pulmonary disease) (CMS/HCC)    • Disease of thyroid gland    • Migraine    • Sleep apnea      Past Surgical History:   Procedure Laterality Date   • APPENDECTOMY     • CHOLECYSTECTOMY     • COLONOSCOPY N/A 4/3/2018    Procedure: COLONOSCOPY;  Surgeon: Jean Morales DO;  Location: [facility] ENDOSCOPY;  Service: Gastroenterology   • HYSTERECTOMY     • SHOULDER SURGERY  2018     Social History     Socioeconomic History   • Marital status:      Spouse name: Not on file   • Number of children: Not on file   • Years of education: Not on file   • Highest education level: Not on file   Tobacco Use   • Smoking status: Current Every Day Smoker     Packs/day: 1.00     Types: Cigarettes   • Smokeless tobacco: Never Used   Substance and Sexual Activity   • Alcohol use: No   • Drug use: No   • Sexual activity: Defer     History reviewed. No pertinent family history.    ALLERGIES:  Allergies   Allergen Reactions   • Biaxin [Clarithromycin] GI Intolerance   • Penicillins Other (See Comments)     childhood   • Ceclor [Cefaclor] Rash   • Keflex [Cephalexin] Rash   • Other Rash     ivp dye   • Reglan [Metoclopramide] Rash         Review of Systems   Constitution: Negative for chills, fever, malaise/fatigue and weight gain.   HENT: Negative for nosebleeds and tinnitus.    Eyes: Negative for blurred vision and double vision.    Cardiovascular: Positive for dyspnea on exertion. Negative for chest pain, irregular heartbeat, leg swelling, palpitations and syncope.   Respiratory: Positive for cough and shortness of breath. Negative for sleep disturbances due to breathing and snoring.    Endocrine: Negative for polydipsia, polyphagia and polyuria.   Hematologic/Lymphatic: Negative for bleeding problem. Does not bruise/bleed easily.   Skin: Negative for color change and suspicious lesions.   Musculoskeletal: Negative for falls and myalgias.   Gastrointestinal: Negative for bloating, heartburn and hematochezia.   Genitourinary: Negative for dysuria and hematuria.   Neurological: Negative for dizziness, headaches, seizures, vertigo and weakness.   Psychiatric/Behavioral: Negative for altered mental status and depression. The patient does not have insomnia and is not nervous/anxious.    Allergic/Immunologic: Negative for environmental allergies and persistent infections.       Current Outpatient Medications   Medication Sig Dispense Refill   • budesonide-formoterol (SYMBICORT) 160-4.5 MCG/ACT inhaler Inhale 2 puffs 2 (Two) Times a Day.     • budesonide-formoterol (Symbicort) 160-4.5 MCG/ACT inhaler Inhale 2 puffs 2 (Two) Times a Day. 1 inhaler 5   • clotrimazole-betamethasone (LOTRISONE) 1-0.05 % lotion Apply to ears twice a day as needed for itching 30 mL 2   • hydrOXYzine pamoate (Vistaril) 25 MG capsule Take 1 capsule by mouth Every 6 (Six) Hours As Needed for Anxiety. 60 capsule 1   • meclizine (ANTIVERT) 25 MG tablet Take 1 tablet by mouth 3 (Three) Times a Day As Needed for Dizziness. 30 tablet 2   • naproxen (NAPROSYN) 500 MG tablet Take 1 tablet by mouth 2 (Two) Times a Day As Needed for Mild Pain  or Moderate Pain . 15 tablet 0   • verapamil (CALAN) 120 MG tablet Take 120 mg by mouth Daily.     • vitamin D (ERGOCALCIFEROL) 15566 units capsule capsule Take 50,000 Units by mouth Every 14 (Fourteen) Days.     • albuterol sulfate  (90  Base) MCG/ACT inhaler Inhale 2 puffs Every 4 (Four) Hours As Needed for Wheezing or Shortness of Air. 18 g 5   • methylPREDNISolone (MEDROL) 4 MG dose pack Take as directed on package instructions. 21 tablet 0     No current facility-administered medications for this visit.        OBJECTIVE:    Physical Exam:   Physical Exam  There were no vitals filed for this visit.    DATA REVIEWED:   Results for orders placed during the hospital encounter of 10/26/20   Adult Transthoracic Echo Complete W/ Cont if Necessary Per Protocol    Narrative · Estimated left ventricular EF = 61% Left ventricular ejection fraction   appears to be 61 - 65%. Left ventricular systolic function is normal.  · Left ventricular diastolic function is consistent with (grade I)   impaired relaxation and age.  · Trace mitral valve regurgitation is present.          No radiology results for the last 30 days.  CXR:         CT:     Labs: BMP, CBC, LIPID, TSH  Lab Results   Component Value Date    GLUCOSE 89 04/27/2018    CALCIUM 9.3 08/17/2018     08/17/2018    K 3.9 08/17/2018    CO2 25 08/17/2018     08/17/2018    BUN 9 08/17/2018    CREATININE 0.8 08/17/2018    EGFRIFAFRI 88 08/17/2018    EGFRIFNONA 79 04/27/2018    BCR 13.3 04/27/2018    ANIONGAP 10 08/17/2018     Lab Results   Component Value Date    WBC 13.4 (H) 08/17/2018    HGB 16.5 (H) 08/17/2018    HCT 48.4 (H) 08/17/2018    MCV 88.6 08/17/2018     08/17/2018     Lab Results   Component Value Date    CHOL 229 (H) 09/25/2020     Lab Results   Component Value Date    TRIG 143 09/25/2020     Lab Results   Component Value Date    HDL 46 09/25/2020     No components found for: LDLCALC  Lab Results   Component Value Date     (H) 09/25/2020     No results found for: HDLLDLRATIO  No components found for: CHOLHDL  No results found for: TSH  No results found for: PROBNP  EKG:       TTE:     Left Ventricle Estimated left ventricular EF = 61% Left ventricular ejection fraction  "appears to be 61 - 65%. Left ventricular systolic function is normal. Normal left ventricular cavity size and wall thickness noted. All left ventricular wall segments contract normally. Left ventricular diastolic function is consistent with (grade I) impaired relaxation and age.   Right Ventricle Normal right ventricular cavity size and systolic function noted. There is no evidence of a right ventricular thrombus or mass present.   Left Atrium Normal left atrial size and volume noted. No evidence of left atrial thrombus or mass present.   Right Atrium Normal right atrial cavity size noted.   Aortic Valve The aortic valve is not well visualized. No significant aortic valve regurgitation is present. No hemodynamically significant aortic valve stenosis is present.   Mitral Valve The mitral valve is grossly normal in structure. Trace mitral valve regurgitation is present.   Tricuspid Valve No evidence of significant tricuspid valve regurgitation is present.   Pulmonic Valve The pulmonic valve is not well visualized. There is no significant pulmonic valve regurgitation present.   Greater Vessels No dilation of the aortic root is present. No dilation of the sinuses of Valsalva is present. No dilation of the proximal aorta is present.   Pericardium There is no evidence of pericardial effusion       Stress tests:     Interpretation Summary    · Findings consistent with an equivocal ECG stress test.  · Left ventricular ejection fraction is hyperdynamic (Calculated EF > 70%). .  · No evidence of Lexiscan induced ischemia      Patient Hx Of Height, Weight, and Vitals    Height Weight BSA (Calculated - sq m) BMI (kg/m2) Pulse BP   162.6 cm (64.02\") 74.4 kg (164 lb 0.4 oz) 1.8 sq meters 28.2  128/70   Reason for Exam    Chest Pain; Ischemic Equivalent   Dx: Precordial pain [R07.2 (ICD-10-CM)]    Stress Data    Stage Heart Rate (BPM) Blood Pressure (mmHg) Minutes Seconds Dose (mg) Comments   1        92        133/67        0     "    10        0.4        10 sec bolus injection             Stress Measurements    Baseline Vitals   Baseline HR 82 bpm      Baseline /68 mmHg       Peak Stress Vitals   Peak  bpm      Peak /67 mmHg       Recovery Vitals   Recovery HR 88 bpm      Recovery /65 mmHg       Exercise Data   Target HR (85%) 133 bpm      Max. Pred. HR (100%) 156 bpm      Percent Max Pred HR 64.74 %      Estimated workload 1 METS         Study Description    Nuclear Study Description A 1-day rest/stress protocol myocardial perfusion imaging study was performed. While at rest, the patient was injected intravenously with 10.4 mCi of technetium sestamibi at 07:21 CDT. Rest imaging was performed approximately 60 minutes after the injection. 0.4 mg / 5 mL of regadenoson given intravenously over approximately 10 seconds. While at peak stress, the patient was injected intravenously with 38 mCi of technetium sestamibi at 08:20 CDT. Stress imaging was performed approximately 90 minutes after the injection. The total amount of radiation received in the study is about 14.57 mSv.   Nuclear Perfusion Images Overall image quality is excellent.   Imaging Contrast/Medications:     technetium sestamibi (CARDIOLITE) injection 1 dose   Given: 1 dose Intravenous    technetium sestamibi (CARDIOLITE) injection 1 dose   Given: 1 dose Intravenous   Stress Procedure    Rest ECG Baseline ECG of normal sinus rhythm noted. Non-specific ST-T wave changes noted.   Stress Description A pharmacological stress test was performed using regadenoson without low-level exercise.   The patient reached the end of the protocol.   Stress ECG Stress ECG of normal sinus rhythm noted. Non-specific ST-T wave changes noted.   Stress Findings Equivocal ECG evidence of myocardial ischemia.Indeterminate clinical evidence of myocardial ischemia. Findings consistent with an equivocal ECG stress test.   Nuclear Perfusion Findings    Rest Perfusion Defect 1 No rest  myocardial perfusion defect noted.   Stress Perfusion Defect 1 No stress myocardial perfusion defect noted.   Ventricle Size / Description Left ventricular ejection fraction is hyperdynamic (Calculated EF > 70%). .   Order-Level Documents:    Scan on 10/27/2020 1000 by Lisa Burkett: LEXISCAN,MGWMACHARIS,10/26/2020         PACS Images     Show images for Stress Test With Myocardial Perfusion One Day    [employer] Images     Show images for Stress Test With Myocardial Perfusion One Day      Signed    Electronically signed by Erickson Doe MD on 10/26/20 at 1313 CDT         [facility]:      The following portions of the patient's history were reviewed and updated as appropriate: allergies, current medications, past family history, past medical history, past social history, past surgical history and problem list.  Old records reviewed and pertinent information is included in the above objective data.     ASSESSMENT/PLAN:       Diagnosis Plan   1. Precordial pain  Resolved.    -Nuclear stress test showing no evidence of Lexiscan induced ischemia.  Normal myocardial perfusion study.  Low risk study.     Given history of first-degree relative with bicuspid aortic valve (BAV) echocardiogram was ordered. Echocardiogram showing normal biventricular function with LVEF at 61 to 65%, grade 1 DD.  There appears to be no evidence of BAV.  No significant valvular abnormalities noted.  Likely d/t COPD.        2. Tobacco dependence  Anna Carvalho  reports that she has been smoking cigarettes. She has been smoking about 1.00 pack per day. She has never used smokeless tobacco.. I have educated her on the risk of diseases from using tobacco products such as cancer, COPD and heart diease.     I advised her to quit and she is not willing to quit.    I spent 3  minutes counseling the patient.          3. Family history of first degree relative with bicuspid aortic valve  Son with hx of BAV.   Given history of first-degree relative with  bicuspid aortic valve (BAV) echocardiogram was ordered. Echocardiogram showing aortic valve was not well visualized, however no evidence to suggest BAV.  No significant valvular abnormalities noted.        4. COPD with possible acute ex.   refilled Symbicort inhaler  -Started albuterol inhaler 2 puffs every 4-6 hours as needed for shortness of breath.  -Medrol Dosepak take as directed.  Referral placed to pulmonology.  No PFT or low-dose CT on file.  Appreciate their help in the treatment and management.       Follow up:  Return as needed.             This document has been electronically signed by ALINE Escamilla on November 2, 2020 15:50 CST

## 2020-12-03 ENCOUNTER — OFFICE VISIT (OUTPATIENT)
Dept: PULMONOLOGY | Facility: CLINIC | Age: 65
End: 2020-12-03

## 2020-12-03 VITALS
DIASTOLIC BLOOD PRESSURE: 74 MMHG | WEIGHT: 160 LBS | HEIGHT: 64 IN | HEART RATE: 67 BPM | BODY MASS INDEX: 27.31 KG/M2 | SYSTOLIC BLOOD PRESSURE: 146 MMHG | OXYGEN SATURATION: 97 %

## 2020-12-03 DIAGNOSIS — J43.1 PANLOBULAR EMPHYSEMA (HCC): Primary | ICD-10-CM

## 2020-12-03 PROCEDURE — 99204 OFFICE O/P NEW MOD 45 MIN: CPT | Performed by: INTERNAL MEDICINE

## 2020-12-03 RX ORDER — AZITHROMYCIN 250 MG/1
TABLET, FILM COATED ORAL SEE ADMIN INSTRUCTIONS
COMMUNITY
Start: 2020-11-19 | End: 2020-12-03

## 2020-12-03 RX ORDER — VERAPAMIL HYDROCHLORIDE 120 MG/1
120 CAPSULE, EXTENDED RELEASE ORAL DAILY
COMMUNITY
Start: 2020-11-30

## 2020-12-03 RX ORDER — DICYCLOMINE HCL 20 MG
1 TABLET ORAL EVERY 12 HOURS
COMMUNITY
End: 2021-09-23

## 2020-12-03 NOTE — PROGRESS NOTES
Pulmonary Consultation    Monica Perez APRN,    Thank you for asking me to see Anna Carvalho for   Chief Complaint   Patient presents with   • COPD   .      History of Present Illness  Anna Carvalho is a 65 y.o. female with a PMH significant for shortness of breath.  She has had a diagnosis of COPD but has been many years ago apparently saw Dr. Pearl before the our computer system was available.  She had an excellent work-up by her primary care provider including a stress test and an echocardiogram in September of this year which were normal.  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  She has never been hospitalized for breathing issues.      Tobacco use history:  She has been a smoker her entire adult life and unfortunately continues to smoke now.    Review of Systems: History obtained from chart review and the patient.  Review of Systems   Constitutional: Positive for fatigue. Negative for appetite change, chills, diaphoresis, fever and unexpected weight change.   HENT: Negative for congestion, dental problem, drooling and ear discharge.    Eyes: Negative for photophobia, redness and visual disturbance.   Respiratory: Positive for cough and shortness of breath. Negative for apnea, choking, chest tightness, wheezing and stridor.    Cardiovascular: Positive for chest pain. Negative for palpitations and leg swelling.   Gastrointestinal: Negative for abdominal pain, anal bleeding and blood in stool.   Endocrine: Negative for polydipsia, polyphagia and polyuria.   Genitourinary: Negative for difficulty urinating, dyspareunia and dysuria.   Musculoskeletal: Negative for arthralgias, back pain and gait problem.   Skin: Negative for color change.   Neurological: Negative for tremors, syncope and weakness.   Hematological: Negative for adenopathy. Does not bruise/bleed easily.   Psychiatric/Behavioral: Positive for dysphoric mood.  Negative for agitation, behavioral problems, confusion and hallucinations. The patient is nervous/anxious.      As described in the HPI. Otherwise, remainder of ROS (14 systems) were negative.    Patient Active Problem List   Diagnosis   • Tobacco dependence   • Precordial pain   • Family history of first degree relative with bicuspid aortic valve   • COPD with acute exacerbation (CMS/[facility])         Current Outpatient Medications:   •  albuterol sulfate  (90 Base) MCG/ACT inhaler, Inhale 2 puffs Every 4 (Four) Hours As Needed for Wheezing or Shortness of Air., Disp: 18 g, Rfl: 5  •  budesonide-formoterol (SYMBICORT) 160-4.5 MCG/ACT inhaler, Inhale 2 puffs 2 (Two) Times a Day., Disp: , Rfl:   •  clotrimazole-betamethasone (LOTRISONE) 1-0.05 % lotion, Apply to ears twice a day as needed for itching, Disp: 30 mL, Rfl: 2  •  dicyclomine (BENTYL) 20 MG tablet, Take 1 tablet by mouth Every 12 (Twelve) Hours., Disp: , Rfl:   •  hydrOXYzine pamoate (Vistaril) 25 MG capsule, Take 1 capsule by mouth Every 6 (Six) Hours As Needed for Anxiety., Disp: 60 capsule, Rfl: 1  •  meclizine (ANTIVERT) 25 MG tablet, Take 1 tablet by mouth 3 (Three) Times a Day As Needed for Dizziness., Disp: 30 tablet, Rfl: 2  •  naproxen (NAPROSYN) 500 MG tablet, Take 1 tablet by mouth 2 (Two) Times a Day As Needed for Mild Pain  or Moderate Pain ., Disp: 15 tablet, Rfl: 0  •  verapamil (CALAN) 120 MG tablet, Take 120 mg by mouth Daily., Disp: , Rfl:   •  verapamil ER (VERELAN) 120 MG 24 hr capsule, Take 120 mg by mouth Daily., Disp: , Rfl:   •  vitamin D (ERGOCALCIFEROL) 85600 units capsule capsule, Take 50,000 Units by mouth Every 14 (Fourteen) Days., Disp: , Rfl:     Allergies   Allergen Reactions   • Biaxin [Clarithromycin] GI Intolerance   • Hydrocodone-Acetaminophen Nausea Only   • Penicillins Other (See Comments)     childhood   • Ceclor [Cefaclor] Rash   • Keflex [Cephalexin] Rash   • Other Rash     ivp dye   • Reglan [Metoclopramide] Rash  "      Past Medical History:   Diagnosis Date   • COPD (chronic obstructive pulmonary disease) (CMS/HCC)    • Disease of thyroid gland    • Migraine    • Sleep apnea      Past Surgical History:   Procedure Laterality Date   • APPENDECTOMY     • CHOLECYSTECTOMY     • COLONOSCOPY N/A 4/3/2018    Procedure: COLONOSCOPY;  Surgeon: Jean Morales DO;  Location: [facility] ENDOSCOPY;  Service: Gastroenterology   • HYSTERECTOMY     • SHOULDER SURGERY  2018     Social History     Socioeconomic History   • Marital status:      Spouse name: Not on file   • Number of children: Not on file   • Years of education: Not on file   • Highest education level: Not on file   Tobacco Use   • Smoking status: Current Every Day Smoker     Packs/day: 1.00     Types: Cigarettes   • Smokeless tobacco: Never Used   Substance and Sexual Activity   • Alcohol use: No   • Drug use: No   • Sexual activity: Defer     History reviewed. No pertinent family history.       Objective     Blood pressure 146/74, pulse 67, height 162.6 cm (64.02\"), weight 72.6 kg (160 lb), SpO2 97 %.  Physical Exam  Vitals signs reviewed.   Constitutional:       General: She is not in acute distress.     Appearance: She is not ill-appearing, toxic-appearing or diaphoretic.   HENT:      Head: Normocephalic and atraumatic.      Nose: Nose normal.      Mouth/Throat:      Comments: No lesions were noted, no evidence of thrush  Eyes:      General: No scleral icterus.  Neck:      Musculoskeletal: No neck rigidity.   Cardiovascular:      Rate and Rhythm: Regular rhythm.      Heart sounds: No murmur. No gallop.    Pulmonary:      Effort: No respiratory distress.      Breath sounds: No stridor. No wheezing or rhonchi.   Abdominal:      General: There is no distension.      Palpations: There is no mass.      Tenderness: There is no abdominal tenderness.      Hernia: No hernia is present.   Musculoskeletal:         General: No swelling, tenderness or deformity.   Lymphadenopathy: "      Cervical: No cervical adenopathy.   Skin:     General: Skin is warm.      Findings: No rash.   Neurological:      General: No focal deficit present.      Mental Status: She is alert and oriented to person, place, and time.      Cranial Nerves: No cranial nerve deficit.   Psychiatric:         Thought Content: Thought content normal.         Judgment: Judgment normal.      Comments: Gregarious         PFTs she has not had pulmonary functions in several years.  I had none to review.  But I did order a test for her.      Radiology she told me she had a recent chest x-ray at an outside source.  I asked her to at least bring me the report on this and better yet the CD so that I can personally review it.       Assessment/Plan     Diagnoses and all orders for this visit:    1. Panlobular emphysema (CMS/HCC) (Primary)  -     Full Pulmonary Function Test With Bronchodilator; Future    I will see her back in after he has a pulmonary function study.  She is also going to bring her report on a recent chest x-ray that was done but I do not have access to at this time she told me she already had a flu shot.  She has not had a pneumonia vaccination and encouraged her to obtain this.  She is on Symbicort and albuterol.  I feel that neither 1 of these are going to be beneficial until she quit smoking.  We talked about use of nicotine replacements and Chantix.  She told me she has tried all the above.  I encouraged her to consider acupuncture and hypnosis if possible.    2.  Ongoing smoking addiction: See above for discussion    3.  History of migraine headaches    4.  History of sleep apnea: She is on CPAP at home    5.  History of anxiety depressive disorder      Patient's Body mass index is 27.45 kg/m². BMI is within normal parameters. No follow-up required..           Return in about 4 weeks (around 12/31/2020).      Thank you for allowing me to participate in the care of Anna Carvalho. Please do not hesitate to  contact me with any questions.         This document has been electronically signed by Dwaine Escobar MD on December 3, 2020 08:58 CST

## 2020-12-14 ENCOUNTER — PROCEDURE VISIT (OUTPATIENT)
Dept: PULMONOLOGY | Facility: CLINIC | Age: 65
End: 2020-12-14

## 2020-12-14 DIAGNOSIS — J43.1 PANLOBULAR EMPHYSEMA (HCC): ICD-10-CM

## 2020-12-14 PROCEDURE — 94060 EVALUATION OF WHEEZING: CPT | Performed by: INTERNAL MEDICINE

## 2020-12-14 PROCEDURE — 94727 GAS DIL/WSHOT DETER LNG VOL: CPT | Performed by: INTERNAL MEDICINE

## 2020-12-14 PROCEDURE — 94729 DIFFUSING CAPACITY: CPT | Performed by: INTERNAL MEDICINE

## 2021-02-02 RX ORDER — CLINDAMYCIN HYDROCHLORIDE 150 MG/1
150 CAPSULE ORAL 4 TIMES DAILY
Qty: 40 CAPSULE | Refills: 0 | Status: SHIPPED | OUTPATIENT
Start: 2021-02-02 | End: 2021-09-23

## 2021-03-15 ENCOUNTER — OFFICE VISIT (OUTPATIENT)
Dept: OTOLARYNGOLOGY | Facility: CLINIC | Age: 66
End: 2021-03-15

## 2021-03-15 VITALS — BODY MASS INDEX: 28.27 KG/M2 | OXYGEN SATURATION: 96 % | WEIGHT: 165.6 LBS | HEIGHT: 64 IN | TEMPERATURE: 98.1 F

## 2021-03-15 DIAGNOSIS — H60.63 CHRONIC NON-INFECTIVE OTITIS EXTERNA OF BOTH EARS, UNSPECIFIED TYPE: Primary | ICD-10-CM

## 2021-03-15 DIAGNOSIS — G43.809 VESTIBULAR MIGRAINE: ICD-10-CM

## 2021-03-15 PROCEDURE — 99213 OFFICE O/P EST LOW 20 MIN: CPT | Performed by: OTOLARYNGOLOGY

## 2021-03-15 NOTE — PROGRESS NOTES
Subjective   Anna Carvalho is a 65 y.o. female.       History of Present Illness   Patient has chronic noninfective otitis externa.  Also has dizziness that was thought to be vestibular migraine and bilateral sensorineural hearing loss that was symmetrical even though she perceives her hearing to be worse on both left.  Reports that she had to reschedule her follow-up visit due to a Covid outbreak where she works and therefore its been 6 months since she has seen me.  She says her ears continue to itch.  She uses Q-tips whenever she gets water in them.  She still has intermittent dizzy spells.  She takes meclizine when she has these but does not currently have a neurologist.      The following portions of the patient's history were reviewed and updated as appropriate: allergies, current medications, past family history, past medical history, past social history, past surgical history and problem list.     reports that she has been smoking cigarettes. She has been smoking about 1.00 pack per day. She has never used smokeless tobacco. She reports that she does not drink alcohol and does not use drugs.   Patient is a tobacco user and has been counseled for use of tobacco products      Review of Systems        Objective   Physical Exam  Ears: External ears no deformity.  Canals show uninfected squamous debris worse on the left than the right that is all cleaned under the microscope using instrumentation.  Beyond this tympanic membrane's are intact no infection or effusion    Assessment/Plan   Diagnoses and all orders for this visit:    1. Chronic non-infective otitis externa of both ears, unspecified type (Primary)    2. Vestibular migraine      Plan: Ears cleaned as described above.  Continue Lotrisone twice a day x7 days then as needed.  No Q-tips.  Asked her to discuss with Dr. Anne and whether a neurology referral for her ongoing vestibular migraines would be appropriate.  See me again in 3 months, call  sooner for problems.

## 2021-06-07 ENCOUNTER — OFFICE VISIT (OUTPATIENT)
Dept: PULMONOLOGY | Facility: CLINIC | Age: 66
End: 2021-06-07

## 2021-06-07 VITALS
DIASTOLIC BLOOD PRESSURE: 70 MMHG | BODY MASS INDEX: 28.41 KG/M2 | SYSTOLIC BLOOD PRESSURE: 140 MMHG | HEIGHT: 64 IN | WEIGHT: 166.4 LBS | TEMPERATURE: 96.4 F | HEART RATE: 75 BPM | OXYGEN SATURATION: 98 %

## 2021-06-07 DIAGNOSIS — J41.0 SIMPLE CHRONIC BRONCHITIS (HCC): ICD-10-CM

## 2021-06-07 DIAGNOSIS — F17.200 TOBACCO DEPENDENCE: Primary | ICD-10-CM

## 2021-06-07 PROCEDURE — 99214 OFFICE O/P EST MOD 30 MIN: CPT | Performed by: INTERNAL MEDICINE

## 2021-06-07 RX ORDER — BUDESONIDE AND FORMOTEROL FUMARATE DIHYDRATE 160; 4.5 UG/1; UG/1
2 AEROSOL RESPIRATORY (INHALATION)
Qty: 1 EACH | Refills: 5 | Status: SHIPPED | OUTPATIENT
Start: 2021-06-07 | End: 2021-10-22 | Stop reason: SDUPTHER

## 2021-06-07 RX ORDER — VERAPAMIL HYDROCHLORIDE 120 MG/1
CAPSULE, EXTENDED RELEASE ORAL
COMMUNITY
Start: 2021-01-11 | End: 2021-09-23 | Stop reason: SDUPTHER

## 2021-06-07 NOTE — PROGRESS NOTES
Pulmonary Office Follow-up    Subjective     Anna Carvalho is seen today at the office for   Chief Complaint   Patient presents with   • Follow-up     Former: Dr. Shawn GRAF  Anna Carvalho is a 66 y.o. female with a PMH significant for COPD  Patient is very tearful because her son passed away a few weeks ago and she has been smoking more because of that. She does endorse shortness of breath with minimal physical activity  She has Symbicort but hasn't been using it regularly, using it more like a rescue inhaler  In the past she has tried quitting cold turkey, Chantix. She's not really confident she can completely quit because she has been smoking so long    Tobacco use history:  Type: cigarettes  Amount: 1 ppd  Duration: 40 years  Cessation: n/a  Willing to quit: Yes      Patient Active Problem List   Diagnosis   • Tobacco dependence   • Precordial pain   • Family history of first degree relative with bicuspid aortic valve   • COPD with acute exacerbation (CMS/[facility])         Medications, Allergies, Social, and Family Histories reviewed as per EMR.    Objective     Vitals:    06/07/21 1028   BP: 140/70   Pulse: 75   Temp: 96.4 °F (35.8 °C)   SpO2: 98%         06/07/21  1028   Weight: 75.5 kg (166 lb 6.4 oz)     [unfilled]  Physical Exam  Vitals reviewed.   Constitutional:       Appearance: Normal appearance.   HENT:      Head: Normocephalic and atraumatic.      Nose: Nose normal.      Mouth/Throat:      Mouth: Mucous membranes are moist.      Pharynx: Oropharynx is clear.   Eyes:      Conjunctiva/sclera: Conjunctivae normal.      Pupils: Pupils are equal, round, and reactive to light.   Cardiovascular:      Rate and Rhythm: Normal rate and regular rhythm.      Pulses: Normal pulses.      Heart sounds: Normal heart sounds.   Pulmonary:      Effort: Pulmonary effort is normal.      Breath sounds: Normal breath sounds.   Abdominal:      General: Abdomen is flat. Bowel sounds are normal.       Palpations: Abdomen is soft.   Musculoskeletal:         General: Normal range of motion.      Cervical back: Normal range of motion.   Skin:     General: Skin is warm and dry.   Neurological:      General: No focal deficit present.      Mental Status: She is alert and oriented to person, place, and time.   Psychiatric:         Mood and Affect: Mood normal.         Behavior: Behavior normal.     PFTs 20   Pre Drug % Predicted    FVC: 81%   FEV1: 78%   FEF 25-75%: 72%   FEV1/FVC: 95%   T%   RV: 85%   DLCO: 59%   D/VAsb: 76%      Post Drug % Predicted    FVC: 95%   FEV1: 64%   FEF 25-75%: 60%   FEV1/FVC: 67%      Change in % (calculated):    FVC: 16   FEV1: -17   FEF 25-75%: -15   FEV1/FVC: -29     Interpretation   Spirometry There is reduced midflow suggesting small airway/airflow obstruction. The patient's FVC, FEV1 and MIDFLOW response to bronchodilators has significant change.   Lung Volume Measurements  Measurements show normal results.   Diffusion Capacity  The patient's diffusion capacity is moderately reduced.  Diffusion capacity is mildly reduced when corrected for alveolar volume.       Assessment/Plan     Diagnoses and all orders for this visit:    1. Tobacco dependence (Primary)    2. Simple chronic bronchitis (CMS/HCC)  -     CT Chest Without Contrast; Future    Other orders  -     budesonide-formoterol (SYMBICORT) 160-4.5 MCG/ACT inhaler; Inhale 2 puffs 2 (Two) Times a Day.  Dispense: 1 each; Refill: 5         Discussion/ Recommendations:   Technically patient's initial wilder was normal, and that positive BDR is likely related to her active smoking. Her DLCO was slightly low, may have a componant of emphysema, will get a chest CT which will also eval for any concerning nodules. I recommended she start using the Symbicort bid as prescribed for now and see if that helps. We had a long conversation that no inhaler is going to work completely as long as she is smoking. She is going to work on  cutting back and hopefully by her follow up she will be closer to 3/4 ppd. The long term goal is obviously complete cessation but I think that will take some time for her    Recs:  -Symbicort 160 bid  -Albuterol hfa prn  -Decrease smoking   -Ct chest          Return in about 6 years (around 6/7/2027).          This document has been electronically signed by Alicia Fontaine DO on June 7, 2021 11:10 CDT

## 2021-06-14 ENCOUNTER — HOSPITAL ENCOUNTER (OUTPATIENT)
Dept: CT IMAGING | Facility: HOSPITAL | Age: 66
Discharge: HOME | End: 2021-06-14
Admitting: INTERNAL MEDICINE

## 2021-06-14 DIAGNOSIS — J41.0 SIMPLE CHRONIC BRONCHITIS (HCC): ICD-10-CM

## 2021-06-14 PROCEDURE — 71250 CT THORAX DX C-: CPT

## 2021-06-24 ENCOUNTER — OFFICE VISIT (OUTPATIENT)
Dept: OTOLARYNGOLOGY | Facility: CLINIC | Age: 66
End: 2021-06-24

## 2021-06-24 VITALS — WEIGHT: 164.4 LBS | BODY MASS INDEX: 28.07 KG/M2 | TEMPERATURE: 97.4 F | HEIGHT: 64 IN

## 2021-06-24 DIAGNOSIS — H60.63 CHRONIC NON-INFECTIVE OTITIS EXTERNA OF BOTH EARS, UNSPECIFIED TYPE: Primary | ICD-10-CM

## 2021-06-24 PROCEDURE — 99212 OFFICE O/P EST SF 10 MIN: CPT | Performed by: OTOLARYNGOLOGY

## 2021-06-24 NOTE — PROGRESS NOTES
Subjective   Anna Carvalho is a 66 y.o. female.       History of Present Illness   Patient is followed with chronic noninfective otitis externa.  Uses Lotrisone.  Says her ears been itching some but not as bad as before.  Reports she is not using Q-tips.      The following portions of the patient's history were reviewed and updated as appropriate: allergies, current medications, past family history, past medical history, past social history, past surgical history and problem list.     reports that she has been smoking cigarettes. She has been smoking about 1.00 pack per day. She has never used smokeless tobacco. She reports that she does not drink alcohol and does not use drugs.   Patient is a tobacco user and has been counseled for use of tobacco products      Review of Systems        Objective   Physical Exam  Both ear canals show uninfected squamous debris more on the left than the right.  This is all cleaned under the microscope using instrumentation.  Beyond this tympanic membranes are intact no infection or effusion      Assessment/Plan   Diagnoses and all orders for this visit:    1. Chronic non-infective otitis externa of both ears, unspecified type (Primary)      Plan: Ears cleaned as described above.  Continue Lotrisone twice a day as needed for itching.  Return 3 months.  Call for problems.

## 2021-07-30 ENCOUNTER — OFFICE VISIT (OUTPATIENT)
Dept: PULMONOLOGY | Facility: CLINIC | Age: 66
End: 2021-07-30

## 2021-07-30 VITALS
SYSTOLIC BLOOD PRESSURE: 112 MMHG | WEIGHT: 167 LBS | HEART RATE: 82 BPM | HEIGHT: 64 IN | TEMPERATURE: 96.9 F | OXYGEN SATURATION: 98 % | BODY MASS INDEX: 28.51 KG/M2 | DIASTOLIC BLOOD PRESSURE: 72 MMHG

## 2021-07-30 DIAGNOSIS — F17.200 TOBACCO DEPENDENCE: Primary | ICD-10-CM

## 2021-07-30 DIAGNOSIS — J43.2 CENTRILOBULAR EMPHYSEMA (HCC): ICD-10-CM

## 2021-07-30 PROCEDURE — 99213 OFFICE O/P EST LOW 20 MIN: CPT | Performed by: INTERNAL MEDICINE
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
## 2021-09-23 ENCOUNTER — OFFICE VISIT (OUTPATIENT)
Dept: OTOLARYNGOLOGY | Facility: CLINIC | Age: 66
End: 2021-09-23

## 2021-09-23 VITALS — OXYGEN SATURATION: 98 % | WEIGHT: 167.6 LBS | BODY MASS INDEX: 28.61 KG/M2 | HEIGHT: 64 IN | HEART RATE: 71 BPM

## 2021-09-23 DIAGNOSIS — H60.63 CHRONIC NON-INFECTIVE OTITIS EXTERNA OF BOTH EARS, UNSPECIFIED TYPE: Primary | ICD-10-CM

## 2021-09-23 PROCEDURE — 99213 OFFICE O/P EST LOW 20 MIN: CPT | Performed by: OTOLARYNGOLOGY

## 2021-09-23 NOTE — PROGRESS NOTES
Subjective   Anna Carvalho is a 66 y.o. female.       History of Present Illness   Patient is followed with chronic otitis externa.  Uses Lotrisone as needed.  Says as usual her left ear is worse than her right.      The following portions of the patient's history were reviewed and updated as appropriate: allergies, current medications, past family history, past medical history, past social history, past surgical history and problem list.     reports that she has been smoking cigarettes. She has been smoking about 1.00 pack per day. She has never used smokeless tobacco. She reports that she does not drink alcohol and does not use drugs.   Patient is a tobacco user and has been counseled for use of tobacco products      Review of Systems        Objective   Physical Exam  Right ear shows only modest uninfected squamous debris that is cleaned under microscope using instrumentation.  Left ear shows quite a bit of uninfected squamous debris that is cleaned under the microscope using instrumentation.  There is also some erythema of the external os without evidence of purulence.      Assessment/Plan   Diagnoses and all orders for this visit:    1. Chronic non-infective otitis externa of both ears, unspecified type (Primary)        Plan: Ears cleaned as described above.  Continue Lotrisone as needed.  She would like to try to go longer between visits and I thought it be reasonable to try 4-month follow-up.  Call for problems.

## 2021-10-22 ENCOUNTER — OFFICE VISIT (OUTPATIENT)
Dept: PULMONOLOGY | Facility: CLINIC | Age: 66
End: 2021-10-22

## 2021-10-22 ENCOUNTER — OFFICE VISIT (OUTPATIENT)
Dept: SLEEP MEDICINE | Facility: HOSPITAL | Age: 66
End: 2021-10-22

## 2021-10-22 VITALS
SYSTOLIC BLOOD PRESSURE: 144 MMHG | TEMPERATURE: 94.1 F | HEIGHT: 64 IN | HEART RATE: 80 BPM | WEIGHT: 166 LBS | BODY MASS INDEX: 28.34 KG/M2 | OXYGEN SATURATION: 98 % | DIASTOLIC BLOOD PRESSURE: 82 MMHG

## 2021-10-22 VITALS
BODY MASS INDEX: 28 KG/M2 | SYSTOLIC BLOOD PRESSURE: 127 MMHG | HEIGHT: 64 IN | WEIGHT: 164 LBS | DIASTOLIC BLOOD PRESSURE: 79 MMHG | HEART RATE: 76 BPM | OXYGEN SATURATION: 96 %

## 2021-10-22 DIAGNOSIS — F51.04 PSYCHOPHYSIOLOGICAL INSOMNIA: ICD-10-CM

## 2021-10-22 DIAGNOSIS — F17.200 TOBACCO DEPENDENCE: ICD-10-CM

## 2021-10-22 DIAGNOSIS — G47.33 OBSTRUCTIVE SLEEP APNEA, ADULT: Primary | ICD-10-CM

## 2021-10-22 DIAGNOSIS — J44.9 CHRONIC OBSTRUCTIVE PULMONARY DISEASE, UNSPECIFIED COPD TYPE (HCC): Primary | ICD-10-CM

## 2021-10-22 PROCEDURE — 99214 OFFICE O/P EST MOD 30 MIN: CPT | Performed by: NURSE PRACTITIONER

## 2021-10-22 PROCEDURE — 99214 OFFICE O/P EST MOD 30 MIN: CPT | Performed by: INTERNAL MEDICINE

## 2021-10-22 RX ORDER — BUDESONIDE AND FORMOTEROL FUMARATE DIHYDRATE 160; 4.5 UG/1; UG/1
2 AEROSOL RESPIRATORY (INHALATION)
Qty: 1 EACH | Refills: 11 | Status: SHIPPED | OUTPATIENT
Start: 2021-10-22 | End: 2022-10-27

## 2021-10-22 RX ORDER — ALBUTEROL SULFATE 90 UG/1
2 AEROSOL, METERED RESPIRATORY (INHALATION) EVERY 4 HOURS PRN
Qty: 18 G | Refills: 5 | Status: SHIPPED | OUTPATIENT
Start: 2021-10-22

## 2021-10-22 RX ORDER — TRAZODONE HYDROCHLORIDE 50 MG/1
50 TABLET ORAL NIGHTLY
Qty: 30 TABLET | Refills: 2 | Status: SHIPPED | OUTPATIENT
Start: 2021-10-22 | End: 2022-08-10 | Stop reason: SDUPTHER

## 2021-10-22 NOTE — PATIENT INSTRUCTIONS
Obesity, Adult  Obesity is the condition of having too much total body fat. Being overweight or obese means that your weight is greater than what is considered healthy for your body size. Obesity is determined by a measurement called BMI. BMI is an estimate of body fat and is calculated from height and weight. For adults, a BMI of 30 or higher is considered obese.  Obesity can lead to other health concerns and major illnesses, including:  · Stroke.  · Coronary artery disease (CAD).  · Type 2 diabetes.  · Some types of cancer, including cancers of the colon, breast, uterus, and gallbladder.  · Osteoarthritis.  · High blood pressure (hypertension).  · High cholesterol.  · Sleep apnea.  · Gallbladder stones.  · Infertility problems.  What are the causes?  Common causes of this condition include:  · Eating daily meals that are high in calories, sugar, and fat.  · Being born with genes that may make you more likely to become obese.  · Having a medical condition that causes obesity, including:  ? Hypothyroidism.  ? Polycystic ovarian syndrome (PCOS).  ? Binge-eating disorder.  ? Cushing syndrome.  · Taking certain medicines, such as steroids, antidepressants, and seizure medicines.  · Not being physically active (sedentary lifestyle).  · Not getting enough sleep.  · Drinking high amounts of sugar-sweetened beverages, such as soft drinks.  What increases the risk?  The following factors may make you more likely to develop this condition:  · Having a family history of obesity.  · Being a woman of  descent.  · Being a man of  descent.  · Living in an area with limited access to:  ? [place], recreation centers, or sidewalks.  ? Healthy food choices, such as grocery stores and farmers' markets.  What are the signs or symptoms?  The main sign of this condition is having too much body fat.  How is this diagnosed?  This condition is diagnosed based on:  · Your BMI. If you are an adult with a BMI of 30 or  higher, you are considered obese.  · Your waist circumference. This measures the distance around your waistline.  · Your skinfold thickness. Your health care provider may gently pinch a fold of your skin and measure it.  You may have other tests to check for underlying conditions.  How is this treated?  Treatment for this condition often includes changing your lifestyle. Treatment may include some or all of the following:  · Dietary changes. This may include developing a healthy meal plan.  · Regular physical activity. This may include activity that causes your heart to beat faster (aerobic exercise) and strength training. Work with your health care provider to design an exercise program that works for you.  · Medicine to help you lose weight if you are unable to lose 1 pound a week after 6 weeks of healthy eating and more physical activity.  · Treating conditions that cause the obesity (underlying conditions).  · Surgery. Surgical options may include gastric banding and gastric bypass. Surgery may be done if:  ? Other treatments have not helped to improve your condition.  ? You have a BMI of 40 or higher.  ? You have life-threatening health problems related to obesity.  Follow these instructions at home:  Eating and drinking    · Follow recommendations from your health care provider about what you eat and drink. Your health care provider may advise you to:  ? Limit fast food, sweets, and processed snack foods.  ? Choose low-fat options, such as low-fat milk instead of whole milk.  ? Eat 5 or more servings of fruits or vegetables every day.  ? Eat at home more often. This gives you more control over what you eat.  ? Choose healthy foods when you eat out.  ? Learn to read food labels. This will help you understand how much food is considered 1 serving.  ? Learn what a healthy serving size is.  ? Keep low-fat snacks available.  ? Limit sugary drinks, such as soda, fruit juice, sweetened iced tea, and flavored  milk.  · Drink enough water to keep your urine pale yellow.  · Do not follow a fad diet. Fad diets can be unhealthy and even dangerous.    Physical activity  · Exercise regularly, as told by your health care provider.  ? Most adults should get up to 150 minutes of moderate-intensity exercise every week.  ? Ask your health care provider what types of exercise are safe for you and how often you should exercise.  · Warm up and stretch before being active.  · Cool down and stretch after being active.  · Rest between periods of activity.  Lifestyle  · Work with your health care provider and a dietitian to set a weight-loss goal that is healthy and reasonable for you.  · Limit your screen time.  · Find ways to reward yourself that do not involve food.  · Do not drink alcohol if:  ? Your health care provider tells you not to drink.  ? You are pregnant, may be pregnant, or are planning to become pregnant.  · If you drink alcohol:  ? Limit how much you use to:  § 0-1 drink a day for women.  § 0-2 drinks a day for men.  ? Be aware of how much alcohol is in your drink. In the [place] one drink equals one 12 oz bottle of beer (355 mL), one 5 oz glass of wine (148 mL), or one 1½ oz glass of hard liquor (44 mL).  General instructions  · Keep a weight-loss journal to keep track of the food you eat and how much exercise you get.  · Take over-the-counter and prescription medicines only as told by your health care provider.  · Take vitamins and supplements only as told by your health care provider.  · Consider joining a support group. Your health care provider may be able to recommend a support group.  · Keep all follow-up visits as told by your health care provider. This is important.  Contact a health care provider if:  · You are unable to meet your weight loss goal after 6 weeks of dietary and lifestyle changes.  Get help right away if you are having:  · Trouble breathing.  · Suicidal thoughts or behaviors.  Summary  · Obesity is  the condition of having too much total body fat.  · Being overweight or obese means that your weight is greater than what is considered healthy for your body size.  · Work with your health care provider and a dietitian to set a weight-loss goal that is healthy and reasonable for you.  · Exercise regularly, as told by your health care provider. Ask your health care provider what types of exercise are safe for you and how often you should exercise.  This information is not intended to replace advice given to you by your health care provider. Make sure you discuss any questions you have with your health care provider.  Document Revised: 08/22/2019 Document Reviewed: 08/22/2019  [employer]vier Patient Education © 2021 [employer]
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
## 2021-10-22 NOTE — PROGRESS NOTES
New Patient Sleep Medicine Consultation    Encounter Date: 10/22/2021         Patient's PCP: Dwain Reyna MD  Referring provider: No ref. provider found  Reason for consultation chief complaint: ILANA currently on CPAP, needs new machine and supplies     Anna Carvalho is a 66 y.o. female whose bedtime is ~ 2200. She  falls asleep after 15 minutes, and is up 2-3 times per night. She wakes up ~ 0600. She endorses 6-7 hours of sleep. She drinks 0 cups of coffee, 0 teas, and 2 sodas per day. She drinks 0 alcoholic beverages per week.      Anna Carvalho admits to unrestful sleep, Disturbed or restless sleep and difficulty staying asleep , ILANA . She denies cataplexy, sleep paralysis, or hypnagogic hallucinations. She takes no medicine specifically for sleep.She takes Vistaril as needed for anxiety but does not take at night. It does not make her sleepy.  She denies sleepiness with driving. She naps  occasionally.  She sleeps in a bed usually. Her mother has moved in with her and she has given her bedroom to her mom because it is on the bottom floor. Since September. She is currently sleeping on the couch. She has not been sleeping as well because her home life is disrupted. Her son also passed away several months ago and she has been trying to cope with that. Prior to her mother moving in she was sleeping well.     She has been on CPAP since she was diagnosed in 2010. She has had the same machine. She needs supplies and new machine.     Sleep study history:   1. PSG on 08/24/2010 AHI of 26.5   2. CPAP titration on 09/16/2010 recommended 11 cm H2O    3. Currently on 11 cm H2O    PAP Data:    Time frame: 10/22/2020-10/21/2021   Compliance 100 %  Average use on days used: 9hrs 53 min  Percent of days with usage greater than or equal to 4 hours: 100%  PAP range : 11 cm H2O  Average 90% pressure: 11 cmH2O  Leak: 5 minutes  Average AHI 0.8 events/hr  Mask type: Nasal mask  DME: [facility] for new machine  "      She continues to smoke. Smoking history: smoked 1 ppds from age 17 until current. Strongly advised to quit.       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  Children: 3  FH of sleep disorders: unknown       Past Medical History:   Diagnosis Date   • COPD (chronic obstructive pulmonary disease) (HCC)    • Disease of thyroid gland    • Migraine    • Sleep apnea      Social History     Socioeconomic History   • Marital status:    Tobacco Use   • Smoking status: Current Every Day Smoker     Packs/day: 1.00     Types: Cigarettes   • Smokeless tobacco: Never Used   Vaping Use   • Vaping Use: Never used   Substance and Sexual Activity   • Alcohol use: No     Comment: none in 10 years   • Drug use: Never   • Sexual activity: Defer     Family History   Adopted: Yes         Review of Systems:  Constitutional: negative  Eyes: negative  Ears, nose, mouth, throat, and face: negative  Respiratory: negative  Cardiovascular: negative  Gastrointestinal: negative  Genitourinary:negative  Integument/breast: negative  Hematologic/lymphatic: negative  Musculoskeletal:negative  Neurological: negative  Behavioral/Psych: negative  Endocrine: negative  Allergic/Immunologic: negative Patient advised to discuss any positive ROS with PCP.      [place] - 12      Vitals:    10/22/21 1046   BP: 127/79   Pulse: 76   SpO2: 96%           10/22/21  1046   Weight: 74.4 kg (164 lb)       Body mass index is 28.14 kg/m². Patient's Body mass index is 28.14 kg/m². indicating that she is overweight (BMI 25-29.9). Obesity-related health conditions include the following: obstructive sleep apnea. Obesity is unchanged. BMI is is above average; BMI management plan is completed. We discussed portion control and increasing exercise..      Neck circumference: 15\"          General: Alert. Cooperative. Well developed. No acute distress.             Head:  Normocephalic. Symmetrical. Atraumatic.              Eyes: Sclera clear. No " icterus. PERRLA. Normal EOM.             Ears: No deformities. Normal hearing.             Nose: No septal deviation. No drainage.          Throat: No oral lesions. No thrush. Moist mucous membranes. Trachea midline    Tongue is normal    Dentition is edentulus top teeth, bottom teeth poor        Pharynx: Posterior pharyngeal pillars are narrow    Mallampati score of IV (only hard palate visible)    Pharynx is nonerythematous   Chest Wall:  Normal shape. Symmetric expansion with respiration. No tenderness.          Lungs:  Clear to auscultation bilaterally. No wheezes. No rhonchi. No rales. Respirations regular, even and unlabored.            Heart:  Regular rhythm and normal rate. Normal S1 and S2. No murmur.  Extremities:  Moves all extremities well. No edema.               Skin: Dry. Intact. No bleeding. No rash.           Neuro: Moves all 4 extremities and cranial nerves grossly intact.  Psychiatric: Normal mood and affect.      Current Outpatient Medications:   •  albuterol sulfate HFA (Ventolin HFA) 108 (90 Base) MCG/ACT inhaler, Inhale 2 puffs Every 4 (Four) Hours As Needed for Wheezing or Shortness of Air., Disp: 18 g, Rfl: 5  •  albuterol sulfate  (90 Base) MCG/ACT inhaler, Inhale 2 puffs Every 4 (Four) Hours As Needed for Wheezing or Shortness of Air., Disp: 18 g, Rfl: 5  •  budesonide-formoterol (SYMBICORT) 160-4.5 MCG/ACT inhaler, Inhale 2 puffs 2 (Two) Times a Day., Disp: 1 each, Rfl: 11  •  clotrimazole-betamethasone (LOTRISONE) 1-0.05 % lotion, Apply to ears twice a day as needed for itching, Disp: 30 mL, Rfl: 2  •  hydrOXYzine pamoate (Vistaril) 25 MG capsule, Take 1 capsule by mouth Every 6 (Six) Hours As Needed for Anxiety., Disp: 60 capsule, Rfl: 1  •  meclizine (ANTIVERT) 25 MG tablet, Take 1 tablet by mouth 3 (Three) Times a Day As Needed for Dizziness., Disp: 30 tablet, Rfl: 2  •  verapamil (CALAN) 120 MG tablet, Take 120 mg by mouth Daily., Disp: , Rfl:   •  verapamil ER (VERELAN) 120  MG 24 hr capsule, Take 120 mg by mouth Daily., Disp: , Rfl:   •  vitamin D (ERGOCALCIFEROL) 87124 units capsule capsule, Take 50,000 Units by mouth Every 14 (Fourteen) Days., Disp: , Rfl:   •  traZODone (DESYREL) 50 MG tablet, Take 1 tablet by mouth Every Night., Disp: 30 tablet, Rfl: 2    Lab Results   Component Value Date    WBC 13.4 (H) 08/17/2018    HGB 16.5 (H) 08/17/2018    HCT 48.4 (H) 08/17/2018    MCV 88.6 08/17/2018     08/17/2018     Lab Results   Component Value Date    GLUCOSE 89 04/27/2018    CALCIUM 9.3 08/17/2018     08/17/2018    K 3.9 08/17/2018    CO2 25 08/17/2018     08/17/2018    BUN 9 08/17/2018    CREATININE 0.8 08/17/2018    EGFRIFAFRI 88 08/17/2018    EGFRIFNONA 79 04/27/2018    BCR 13.3 04/27/2018    ANIONGAP 10 08/17/2018     No results found for: INR, PROTIME  No results found for: CKTOTAL, CKMB, CKMBINDEX, TROPONINI, TROPONINT    No results found for: PHART, LDB7UZX, PO2ART]      Assessment and Plan:    1. Obstructive sleep apnea- New (to me), no additional work-up planned (3)  1. Order for new machine CPAP 11 cm H2O  2. DME: [facility]   3. Continue PAP as prescribed   4. Drowsy driving tips- do not drive if feeling sleepy   5. RTC in 3 months with compliance report, or sooner if needed   4.   Insomnia - sleep onset and or maintenance New (to me), no additional work-up planned (3)    1.   Start on trazodone 50 mg nightly - discussed dosage, usage, and possible side effects    2.   Good sleep hygiene       I obtained a brief history from the patient, reviewed the medical problems and current medications, and made medical decisions regarding treatment based on that information.   I spent 30 minutes caring for Anna on this date of service. This time includes time spent by me in the following activities: preparing for the visit, obtaining and/or reviewing a separately obtained history, performing a medically appropriate examination and/or evaluation, counseling and educating  the patient/family/caregiver, ordering medications, tests, or procedures and documenting information in the medical record. I answered all of her questions and she verbalized understanding.            RTC 2 weeks after study for results.             This document has been electronically signed by ALINE Lopes on October 22, 2021 11:05 CDT          This document has been electronically signed by ALINE Lopes on October 22, 2021         CC: Dwain Reyna MD          No ref. provider found

## 2022-02-10 ENCOUNTER — OFFICE VISIT (OUTPATIENT)
Dept: SLEEP MEDICINE | Facility: HOSPITAL | Age: 67
End: 2022-02-10

## 2022-02-10 VITALS
HEART RATE: 82 BPM | WEIGHT: 164 LBS | SYSTOLIC BLOOD PRESSURE: 136 MMHG | BODY MASS INDEX: 28 KG/M2 | OXYGEN SATURATION: 98 % | HEIGHT: 64 IN | DIASTOLIC BLOOD PRESSURE: 74 MMHG

## 2022-02-10 DIAGNOSIS — G25.81 RESTLESS LEGS SYNDROME (RLS): ICD-10-CM

## 2022-02-10 DIAGNOSIS — F51.04 PSYCHOPHYSIOLOGICAL INSOMNIA: ICD-10-CM

## 2022-02-10 DIAGNOSIS — G47.33 OSA (OBSTRUCTIVE SLEEP APNEA): Primary | ICD-10-CM

## 2022-02-10 PROCEDURE — 99212 OFFICE O/P EST SF 10 MIN: CPT | Performed by: NURSE PRACTITIONER

## 2022-02-10 NOTE — PROGRESS NOTES
Sleep Clinic Follow Up    Date: 2/10/2022  Primary Care Provider: Dwain Reyna MD    Last office visit: 10/22/2021 (I reviewed this note)    CC: Follow up: ILANA on CPAP, new machine follow-up       Interim History:  Since the last visit:    1) moderate ILANA -  Anna Carvalho has remained compliant with CPAP. She denies mask and machine issues, dry mouth, headaches, or pressures intolerance. She denies abnormal dreams, sleep paralysis, nasal congestion, URI sx.    Overall she likes her new machine and is doing well with CPAP. Her sleep has been irregular because she has been up and down with her mother.     She has been caring for her mother who passed away in January. She has not yet tried taking trazodone but is going to start taking it. She was worried to take it when she was caring for her mother.     2) Patient reports rare RLS symptoms.       Sleep Testin. PSG on 2010, AHI of 26.5   2. CPAP titration on 2010, recommended 11 cm H2O   3. Currently on 11 cm H2O    PAP Data:    Time frame: 2022-02/10/2022   Compliance: 97.5 %  Average use on days used: 9hrs 16 min  Percent of days with usage greater than or equal to 4 hours: 97.5%  PAP range: 11 cm H2O  Average 90% pressure: 11 cmH2O  Leak: 3.5 L/ minutes  Average AHI: 0.7 events/hr  Mask type: Nasal mask  DME: [facility]     Bed time: 2100  Sleep latency: 10 minutes  Number of times awakens during the night: 2-3  Wake time: 0600  Estimated total sleep time at night: 5-7 hours  Caffeine intake: 0 cups of coffee, 0 cups of tea, and 4 sodas per day  Alcohol intake: 0 drinks per week  Nap time: most days 30 minutes    Sleepiness with Driving: denies      [place] - 10        PMHx, FH, SH reviewed and pertinent changes are: mother passed away       REVIEW OF SYSTEMS:   Negative for chest pain, SOA, fever, chills, cough, N/V/D, abdominal pain.    Smoking:< 1ppd    Anna Carvalho  reports that she has been smoking cigarettes. She has  been smoking about 1.00 pack per day. She has never used smokeless tobacco.. I have educated her on the risk of diseases from using tobacco products such as cancer, COPD and heart disease.     I advised her to quit.  She is working with Dr. Fontaine to quit.          Exam:  Vitals:    02/10/22 1040   BP: 136/74   Pulse: 82   SpO2: 98%           02/10/22  1040   Weight: 74.4 kg (164 lb)     Body mass index is 28.14 kg/m². Patient's Body mass index is 28.14 kg/m². indicating that she is overweight (BMI 25-29.9). Patient's (Body mass index is 28.14 kg/m².) indicates that they are overweight with health conditions that include obstructive sleep apnea . Weight is unchanged. BMI is is above average; BMI management plan is completed. We discussed portion control and increasing exercise. .      Gen:                No distress, conversant, pleasant, appears stated age, alert, oriented  Eyes:               Anicteric sclera, moist conjunctiva, no lid lag                           EOMI   Lungs:             normal effort, non-labored breathing                          Clear to auscultation bilaterally          CV:                  Normal S1/S2, no murmur                          no lower extremity edema                 Psych:             Appropriate affect  Neuro:             CN 2-12 appear intact    Past Medical History:   Diagnosis Date   • COPD (chronic obstructive pulmonary disease) (HCC)    • Disease of thyroid gland    • Migraine    • Sleep apnea        Current Outpatient Medications:   •  budesonide-formoterol (SYMBICORT) 160-4.5 MCG/ACT inhaler, Inhale 2 puffs 2 (Two) Times a Day., Disp: 1 each, Rfl: 11  •  clotrimazole-betamethasone (LOTRISONE) 1-0.05 % lotion, Apply to ears twice a day as needed for itching, Disp: 30 mL, Rfl: 2  •  hydrOXYzine pamoate (Vistaril) 25 MG capsule, Take 1 capsule by mouth Every 6 (Six) Hours As Needed for Anxiety., Disp: 60 capsule, Rfl: 1  •  meclizine (ANTIVERT) 25 MG tablet, Take 1 tablet  by mouth 3 (Three) Times a Day As Needed for Dizziness., Disp: 30 tablet, Rfl: 2  •  traZODone (DESYREL) 50 MG tablet, Take 1 tablet by mouth Every Night., Disp: 30 tablet, Rfl: 2  •  verapamil ER (VERELAN) 120 MG 24 hr capsule, Take 120 mg by mouth Daily., Disp: , Rfl:   •  vitamin D (ERGOCALCIFEROL) 75171 units capsule capsule, Take 50,000 Units by mouth Every 14 (Fourteen) Days., Disp: , Rfl:   •  albuterol sulfate HFA (Ventolin HFA) 108 (90 Base) MCG/ACT inhaler, Inhale 2 puffs Every 4 (Four) Hours As Needed for Wheezing or Shortness of Air., Disp: 18 g, Rfl: 5      Assessment and Plan:    1. Obstructive sleep apnea  -Established, stable (1)  1. Compliant with PAP therapy  2. Continue PAP as prescribed  3. Script for PAP supplies  4. Drowsy driving tips- do not drive if feeling sleepy   5. Return to clinic in 6 months with compliance report unless change in symptoms in interim period  2. Restless leg syndrome/Periodic limb movement disorder (RLS/PLMD) - self-limited or minor problem   1. Non-pharmacological treatment methods   3. Insomnia - sleep onset and or maintenance - Established, stable (1)   1. Trazodone 50 mg nightly   2. Good sleep hygiene             I spent 15 minutes caring for Anna on this date of service. This time includes time spent by me in the following activities: preparing for the visit, obtaining and/or reviewing a separately obtained history, performing a medically appropriate examination and/or evaluation, counseling and educating the patient/family/caregiver, ordering medications, tests, or procedures and documenting information in the medical record.           This document has been electronically signed by ALINE Lopes on February 10, 2022 10:44 CST            CC: Dwain Reyna MD          No ref. provider found

## 2022-04-26 ENCOUNTER — HOSPITAL ENCOUNTER (OUTPATIENT)
Age: 67
Setting detail: SPECIMEN
Discharge: HOME | End: 2022-04-26
Payer: MEDICARE

## 2022-04-26 PROCEDURE — 88305 TISSUE EXAM BY PATHOLOGIST: CPT

## 2022-04-27 ENCOUNTER — OFFICE VISIT (OUTPATIENT)
Dept: PULMONOLOGY | Facility: CLINIC | Age: 67
End: 2022-04-27

## 2022-04-27 VITALS
WEIGHT: 167 LBS | SYSTOLIC BLOOD PRESSURE: 128 MMHG | BODY MASS INDEX: 28.51 KG/M2 | HEART RATE: 81 BPM | OXYGEN SATURATION: 98 % | DIASTOLIC BLOOD PRESSURE: 70 MMHG | HEIGHT: 64 IN

## 2022-04-27 DIAGNOSIS — J44.9 CHRONIC OBSTRUCTIVE PULMONARY DISEASE, UNSPECIFIED COPD TYPE: Primary | ICD-10-CM

## 2022-04-27 DIAGNOSIS — Z87.891 PERSONAL HISTORY OF NICOTINE DEPENDENCE: ICD-10-CM

## 2022-04-27 DIAGNOSIS — F17.200 TOBACCO DEPENDENCE: ICD-10-CM

## 2022-04-27 DIAGNOSIS — G47.33 OSA (OBSTRUCTIVE SLEEP APNEA): ICD-10-CM

## 2022-04-27 PROCEDURE — 99213 OFFICE O/P EST LOW 20 MIN: CPT | Performed by: INTERNAL MEDICINE
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
## 2022-05-26 RX ORDER — AZITHROMYCIN 250 MG/1
TABLET, FILM COATED ORAL
Qty: 6 TABLET | Refills: 0 | Status: SHIPPED | OUTPATIENT
Start: 2022-05-26 | End: 2022-08-10

## 2022-05-26 RX ORDER — GUAIFENESIN/DEXTROMETHORPHAN 100-10MG/5
10 SYRUP ORAL 4 TIMES DAILY PRN
Qty: 240 ML | Refills: 1 | Status: SHIPPED | OUTPATIENT
Start: 2022-05-26 | End: 2022-08-10

## 2022-05-26 RX ORDER — BENZONATATE 100 MG/1
100 CAPSULE ORAL 3 TIMES DAILY PRN
Qty: 60 CAPSULE | Refills: 0 | Status: SHIPPED | OUTPATIENT
Start: 2022-05-26 | End: 2022-08-10

## 2022-06-13 ENCOUNTER — TELEPHONE (OUTPATIENT)
Dept: GENERAL RADIOLOGY | Facility: HOSPITAL | Age: 67
End: 2022-06-13

## 2022-08-10 ENCOUNTER — OFFICE VISIT (OUTPATIENT)
Dept: SLEEP MEDICINE | Facility: HOSPITAL | Age: 67
End: 2022-08-10

## 2022-08-10 VITALS
SYSTOLIC BLOOD PRESSURE: 136 MMHG | DIASTOLIC BLOOD PRESSURE: 68 MMHG | BODY MASS INDEX: 27.28 KG/M2 | HEART RATE: 73 BPM | WEIGHT: 159.8 LBS | HEIGHT: 64 IN | OXYGEN SATURATION: 95 %

## 2022-08-10 DIAGNOSIS — G25.81 RLS (RESTLESS LEGS SYNDROME): ICD-10-CM

## 2022-08-10 DIAGNOSIS — F51.04 PSYCHOPHYSIOLOGICAL INSOMNIA: ICD-10-CM

## 2022-08-10 DIAGNOSIS — G47.33 OSA (OBSTRUCTIVE SLEEP APNEA): Primary | ICD-10-CM

## 2022-08-10 PROCEDURE — 99212 OFFICE O/P EST SF 10 MIN: CPT | Performed by: NURSE PRACTITIONER

## 2022-08-10 RX ORDER — TRAZODONE HYDROCHLORIDE 50 MG/1
50 TABLET ORAL NIGHTLY
Qty: 30 TABLET | Refills: 8 | Status: SHIPPED | OUTPATIENT
Start: 2022-08-10

## 2022-08-10 NOTE — PROGRESS NOTES
Sleep Clinic Follow Up    Date: 8/10/2022  Primary Care Provider: Dwain Reyna MD    Last office visit: 02/10/2022 (I reviewed this note)    CC: Follow up: ILANA on CPAP, 6 month      Interim History:  Since the last visit:    1) moderate ILANA -  Anna Carvalho has remained compliant with CPAP. She denies mask and machine issues, dry mouth, headaches, or pressures intolerance. She denies abnormal dreams, sleep paralysis, nasal congestion, URI sx.  Overall doing well with CPAP.     2) Patient reports very rare RLS symptoms.    Takes trazodone 25-50 mg most nights. Helps with insomnia. States trouble falling asleep is situational. Has a lot going on at home.     Sleep Testin. PSG on 2010, AHI of 26.5   2. CPAP titration on 2010, recommended 11 cm H2O   3. Currently on 11 cm H2O    PAP Data:    Time frame: 2022-08/10/2022   Compliance: 99 %  Average use on days used: 8hrs 31 min  Percent of days with usage greater than or equal to 4 hours: 99%  PAP range: 11 cm H2O  Average 90% pressure: 11 cmH2O  Leak: 0 minutes  Average AHI: 0.7 events/hr  Mask type: Nasal mask  DME: [facility]     Bed time: 2200  Sleep latency: 15 minutes  Number of times awakens during the night: 1  Wake time: 0700  Estimated total sleep time at night: 8 hours  Caffeine intake: 0 cups of coffee, 0 cups of tea, and 3 sodas per day  Alcohol intake: 0 drinks per week  Nap time: most days    Sleepiness with Driving: denies      [employer] - 11        PMHx, FH, SH reviewed and pertinent changes are:  unchanged from last office visit on 02/10/2022      REVIEW OF SYSTEMS:   Negative for chest pain, SOA, fever, chills, cough, N/V/D, abdominal pain.    Smoking:< 1ppd    Anna Carvalho  reports that she has been smoking cigarettes. She has been smoking about 1.00 pack per day. She has never used smokeless tobacco.. I have educated her on the risk of diseases from using tobacco products such as cancer, COPD and heart  disease.     I advised her to quit. Working with pulmonology to quit.          Exam:  Vitals:    08/10/22 1005   BP: 136/68   Pulse: 73   SpO2: 95%           08/10/22  1005   Weight: 72.5 kg (159 lb 12.8 oz)     Body mass index is 27.42 kg/m². BMI is >= 25 and <30. (Overweight) The following options were offered after discussion;: nutrition counseling/recommendations      Gen:                No distress, conversant, pleasant, appears stated age, alert, oriented  Eyes:               Anicteric sclera, moist conjunctiva, no lid lag                           EOMI   Lungs:             normal effort, non-labored breathing                          Clear to auscultation bilaterally          CV:                  Normal S1/S2, no murmur                          no lower extremity edema                 Psych:             Appropriate affect  Neuro:             CN 2-12 appear intact    Past Medical History:   Diagnosis Date   • COPD (chronic obstructive pulmonary disease) (HCC)    • Disease of thyroid gland    • Migraine    • Sleep apnea        Current Outpatient Medications:   •  albuterol sulfate HFA (Ventolin HFA) 108 (90 Base) MCG/ACT inhaler, Inhale 2 puffs Every 4 (Four) Hours As Needed for Wheezing or Shortness of Air., Disp: 18 g, Rfl: 5  •  budesonide-formoterol (SYMBICORT) 160-4.5 MCG/ACT inhaler, Inhale 2 puffs 2 (Two) Times a Day., Disp: 1 each, Rfl: 11  •  clotrimazole-betamethasone (LOTRISONE) 1-0.05 % lotion, Apply to ears twice a day as needed for itching, Disp: 30 mL, Rfl: 2  •  hydrOXYzine pamoate (Vistaril) 25 MG capsule, Take 1 capsule by mouth Every 6 (Six) Hours As Needed for Anxiety., Disp: 60 capsule, Rfl: 1  •  meclizine (ANTIVERT) 25 MG tablet, Take 1 tablet by mouth 3 (Three) Times a Day As Needed for Dizziness., Disp: 30 tablet, Rfl: 2  •  traZODone (DESYREL) 50 MG tablet, Take 1 tablet by mouth Every Night., Disp: 30 tablet, Rfl: 2  •  verapamil ER (VERELAN) 120 MG 24 hr capsule, Take 120 mg by mouth  Daily., Disp: , Rfl:   •  vitamin D (ERGOCALCIFEROL) 45742 units capsule capsule, Take 50,000 Units by mouth Every 14 (Fourteen) Days., Disp: , Rfl:       Assessment and Plan:    1. Obstructive sleep apnea    1. Compliant with PAP therapy  2. Continue PAP as prescribed  3. Script for PAP supplies  4. Drowsy driving tips- do not drive if feeling sleepy   5. Return to clinic in 12 months with compliance report unless change in symptoms in interim period  2. Restless leg syndrome/Periodic limb movement disorder   1. Symptoms minimal. Non-pharmacological treatment methods   3. Insomnia - sleep onset and or maintenance -  1. Trazodone 25-50 mg nightly as needed for sleep - refill today   2. Good sleep hygiene           I spent 12 minutes caring for Anna on this date of service. This time includes time spent by me in the following activities: preparing for the visit, obtaining and/or reviewing a separately obtained history, performing a medically appropriate examination and/or evaluation, counseling and educating the patient/family/caregiver, ordering medications, tests, or procedures and documenting information in the medical record.           This document has been electronically signed by ALINE Lopes on August 10, 2022 10:18 CDT            CC: Dwain Reyna MD          No ref. provider found

## 2022-10-10 ENCOUNTER — HOSPITAL ENCOUNTER (OUTPATIENT)
Dept: CT IMAGING | Facility: HOSPITAL | Age: 67
Discharge: HOME | End: 2022-10-10
Admitting: INTERNAL MEDICINE

## 2022-10-10 DIAGNOSIS — F17.200 TOBACCO DEPENDENCE: ICD-10-CM

## 2022-10-10 DIAGNOSIS — Z87.891 PERSONAL HISTORY OF NICOTINE DEPENDENCE: ICD-10-CM

## 2022-10-10 PROCEDURE — 71271 CT THORAX LUNG CANCER SCR C-: CPT

## 2022-10-27 ENCOUNTER — OFFICE VISIT (OUTPATIENT)
Dept: PULMONOLOGY | Facility: CLINIC | Age: 67
End: 2022-10-27

## 2022-10-27 VITALS
SYSTOLIC BLOOD PRESSURE: 118 MMHG | DIASTOLIC BLOOD PRESSURE: 72 MMHG | HEIGHT: 64 IN | BODY MASS INDEX: 28.1 KG/M2 | HEART RATE: 77 BPM | WEIGHT: 164.6 LBS | OXYGEN SATURATION: 98 %

## 2022-10-27 DIAGNOSIS — F17.200 TOBACCO DEPENDENCE: ICD-10-CM

## 2022-10-27 DIAGNOSIS — J44.9 MODERATE COPD (CHRONIC OBSTRUCTIVE PULMONARY DISEASE): Primary | ICD-10-CM

## 2022-10-27 DIAGNOSIS — G47.33 OSA (OBSTRUCTIVE SLEEP APNEA): ICD-10-CM

## 2022-10-27 PROCEDURE — 99214 OFFICE O/P EST MOD 30 MIN: CPT | Performed by: NURSE PRACTITIONER

## 2022-10-27 RX ORDER — BUDESONIDE, GLYCOPYRROLATE, AND FORMOTEROL FUMARATE 160; 9; 4.8 UG/1; UG/1; UG/1
2 AEROSOL, METERED RESPIRATORY (INHALATION) 2 TIMES DAILY
Qty: 5.9 G | Refills: 11 | Status: SHIPPED | OUTPATIENT
Start: 2022-10-27
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
## 2023-03-07 ENCOUNTER — APPOINTMENT (OUTPATIENT)
Dept: GENERAL RADIOLOGY | Facility: HOSPITAL | Age: 68
End: 2023-03-07
Payer: MEDICARE

## 2023-03-07 ENCOUNTER — HOSPITAL ENCOUNTER (EMERGENCY)
Facility: HOSPITAL | Age: 68
Discharge: HOME | End: 2023-03-07
Attending: EMERGENCY MEDICINE | Admitting: EMERGENCY MEDICINE
Payer: MEDICARE

## 2023-03-07 VITALS
DIASTOLIC BLOOD PRESSURE: 72 MMHG | RESPIRATION RATE: 18 BRPM | OXYGEN SATURATION: 99 % | HEART RATE: 72 BPM | HEIGHT: 64 IN | TEMPERATURE: 98.3 F | BODY MASS INDEX: 27.31 KG/M2 | SYSTOLIC BLOOD PRESSURE: 134 MMHG | WEIGHT: 160 LBS

## 2023-03-07 DIAGNOSIS — S82.832A CLOSED FRACTURE OF DISTAL END OF LEFT FIBULA, UNSPECIFIED FRACTURE MORPHOLOGY, INITIAL ENCOUNTER: Primary | ICD-10-CM

## 2023-03-07 PROCEDURE — 73590 X-RAY EXAM OF LOWER LEG: CPT

## 2023-03-07 PROCEDURE — 99284 EMERGENCY DEPT VISIT MOD MDM: CPT

## 2023-03-07 PROCEDURE — 73630 X-RAY EXAM OF FOOT: CPT

## 2023-03-07 PROCEDURE — 73610 X-RAY EXAM OF ANKLE: CPT

## 2023-03-07 RX ORDER — OXYCODONE HYDROCHLORIDE AND ACETAMINOPHEN 5; 325 MG/1; MG/1
1 TABLET ORAL ONCE
Status: COMPLETED | OUTPATIENT
Start: 2023-03-07 | End: 2023-03-07

## 2023-03-07 RX ORDER — HYDROCODONE BITARTRATE AND ACETAMINOPHEN 5; 325 MG/1; MG/1
1 TABLET ORAL EVERY 6 HOURS PRN
Qty: 12 TABLET | Refills: 0 | Status: SHIPPED | OUTPATIENT
Start: 2023-03-07 | End: 2023-03-09 | Stop reason: SDUPTHER

## 2023-03-07 RX ADMIN — OXYCODONE HYDROCHLORIDE AND ACETAMINOPHEN 1 TABLET: 5; 325 TABLET ORAL at 16:38

## 2023-03-07 NOTE — ED PROVIDER NOTES
Subjective   History of Present Illness  68yo female pmh significant copd/kareen/RA presents ED c/o acute onset left ankle/foot pain/swelling status post stepping into hole in the ground causing hyperinversion injury.  ROS otherwise noncontributory.    History provided by:  Patient  Ankle Injury  Severity:  Severe  Onset quality:  Sudden  Chronicity:  New      Review of Systems   Constitutional: Negative.    HENT: Negative.    Eyes: Negative for redness.   Respiratory: Negative.    Cardiovascular: Negative.    Gastrointestinal: Negative.    Genitourinary: Negative.    Musculoskeletal: Positive for arthralgias and joint swelling.   Skin: Negative.    Allergic/Immunologic: Negative for immunocompromised state.   All other systems reviewed and are negative.      Past Medical History:   Diagnosis Date   • COPD (chronic obstructive pulmonary disease) (HCC)    • Disease of thyroid gland    • Migraine    • Sleep apnea        Allergies   Allergen Reactions   • Biaxin [Clarithromycin] GI Intolerance   • Hydrocodone-Acetaminophen Nausea Only   • Penicillins Other (See Comments)     childhood   • Ceclor [Cefaclor] Rash   • Keflex [Cephalexin] Rash   • Other Rash     ivp dye   • Reglan [Metoclopramide] Rash       Past Surgical History:   Procedure Laterality Date   • APPENDECTOMY     • CHOLECYSTECTOMY     • COLONOSCOPY N/A 4/3/2018    Procedure: COLONOSCOPY;  Surgeon: Jean Morales DO;  Location: [facility] ENDOSCOPY;  Service: Gastroenterology   • HYSTERECTOMY     • SHOULDER SURGERY  2018       Family History   Adopted: Yes       Social History     Socioeconomic History   • Marital status:    Tobacco Use   • Smoking status: Every Day     Packs/day: 1.00     Types: Cigarettes   • Smokeless tobacco: Never   Vaping Use   • Vaping Use: Never used   Substance and Sexual Activity   • Alcohol use: No     Comment: none in 10 years   • Drug use: Never   • Sexual activity: Defer           Objective   Physical Exam  Vitals and  nursing note reviewed.   Constitutional:       Appearance: Normal appearance.   HENT:      Head: Normocephalic and atraumatic.      Right Ear: External ear normal.      Left Ear: External ear normal.      Nose: Nose normal.      Mouth/Throat:      Mouth: Mucous membranes are moist.   Eyes:      Conjunctiva/sclera: Conjunctivae normal.      Pupils: Pupils are equal, round, and reactive to light.   Cardiovascular:      Rate and Rhythm: Normal rate and regular rhythm.      Pulses: Normal pulses.      Heart sounds: Normal heart sounds. No murmur heard.    No friction rub. No gallop.   Pulmonary:      Effort: Pulmonary effort is normal. No respiratory distress.      Breath sounds: Normal breath sounds. No wheezing, rhonchi or rales.   Abdominal:      General: Abdomen is flat. Bowel sounds are normal. There is no distension.      Palpations: Abdomen is soft.      Tenderness: There is no abdominal tenderness. There is no guarding or rebound.   Musculoskeletal:         General: Swelling, tenderness and signs of injury present. No deformity.      Cervical back: Normal range of motion. No rigidity.      Right lower leg: No edema.      Left lower leg: Edema present.      Left ankle: Swelling and ecchymosis present. Tenderness present over the lateral malleolus and ATF ligament. Decreased range of motion.      Left Achilles Tendon: Normal.      Left foot: Normal capillary refill. Swelling and tenderness present. No deformity. Normal pulse.        Legs:    Lymphadenopathy:      Cervical: No cervical adenopathy.   Skin:     General: Skin is warm and dry.   Neurological:      General: No focal deficit present.      Mental Status: She is alert and oriented to person, place, and time.      GCS: GCS eye subscore is 4. GCS verbal subscore is 5. GCS motor subscore is 6.         Procedures           ED Course      XR Tibia Fibula 2 View Left    Result Date: 3/7/2023  Narrative: EXAM: XR TIBIA FIBULA 2 VIEWS, XR FOOT 3 OR MORE VIEWS, XR  ANKLE 3 OR MORE VIEWS ORDERING PROVIDER: PRESTON PAEZ CLINICAL HISTORY: Trauma COMPARISON STUDY: TECHNIQUE: 2 views of the left lower leg, 3 views of the left ankle, and 3 views of the left foot FINDINGS: Mildly displaced avulsion fracture of the distal fibula at the ankle joint. No evidence of talar tilt or talar shift. There is no acute displaced fracture in the left tibia, or in the left foot.  The rest of osseous alignment is anatomic.  No soft tissue abnormality, or radiopaque foreign body is seen.  There is no knee or ankle joint effusion. Unremarkable alignment of the Lisfranc joint. Base of the fifth metatarsal is intact. Unremarkable subtalar joint.     Impression: Mildly displaced avulsion fracture of the distal fibula at the ankle joint. No evidence of talar tilt or talar shift.  Electronically signed by:  Lusi Peguero MD  3/7/2023 4:57 PM CST Workstation: [id number]    XR Ankle 3+ View Left    Result Date: 3/7/2023  Narrative: EXAM: XR TIBIA FIBULA 2 VIEWS, XR FOOT 3 OR MORE VIEWS, XR ANKLE 3 OR MORE VIEWS ORDERING PROVIDER: PRESTON PAEZ CLINICAL HISTORY: Trauma COMPARISON STUDY: TECHNIQUE: 2 views of the left lower leg, 3 views of the left ankle, and 3 views of the left foot FINDINGS: Mildly displaced avulsion fracture of the distal fibula at the ankle joint. No evidence of talar tilt or talar shift. There is no acute displaced fracture in the left tibia, or in the left foot.  The rest of osseous alignment is anatomic.  No soft tissue abnormality, or radiopaque foreign body is seen.  There is no knee or ankle joint effusion. Unremarkable alignment of the Lisfranc joint. Base of the fifth metatarsal is intact. Unremarkable subtalar joint.     Impression: Mildly displaced avulsion fracture of the distal fibula at the ankle joint. No evidence of talar tilt or talar shift.  Electronically signed by:  Luis Peguero MD  3/7/2023 4:57 PM CST Workstation: [id number]    XR Foot 3+ View Left    Result Date:  3/7/2023  Narrative: EXAM: XR TIBIA FIBULA 2 VIEWS, XR FOOT 3 OR MORE VIEWS, XR ANKLE 3 OR MORE VIEWS ORDERING PROVIDER: FLETCHER KHALIL CLINICAL HISTORY: Trauma COMPARISON STUDY: TECHNIQUE: 2 views of the left lower leg, 3 views of the left ankle, and 3 views of the left foot FINDINGS: Mildly displaced avulsion fracture of the distal fibula at the ankle joint. No evidence of talar tilt or talar shift. There is no acute displaced fracture in the left tibia, or in the left foot.  The rest of osseous alignment is anatomic.  No soft tissue abnormality, or radiopaque foreign body is seen.  There is no knee or ankle joint effusion. Unremarkable alignment of the Lisfranc joint. Base of the fifth metatarsal is intact. Unremarkable subtalar joint.     Impression: Mildly displaced avulsion fracture of the distal fibula at the ankle joint. No evidence of talar tilt or talar shift.  Electronically signed by:  Luis Peguero MD  3/7/2023 4:57 PM [facility] Workstation: [id number]                                 [facility] reviewed by Fletcher Khalil MD       [facility]    Final diagnoses:   Closed fracture of distal end of left fibula, unspecified fracture morphology, initial encounter       ED Disposition  ED Disposition     ED Disposition   Discharge    Condition   Good    Comment   --             Bonilla Vincent, DPSAMUEL  [address]  [address] [facility] [address] [phone]    In 2 days           Medication List      New Prescriptions    HYDROcodone-acetaminophen 5-325 MG per tablet  Commonly known as: NORCO  Take 1 tablet by mouth Every 6 (Six) Hours As Needed for Moderate Pain.           Where to Get Your Medications      These medications were sent to [facility] - [place] - [facility] MILKA Reyes - [phone] [facility] [phone] [facility] [place] [address]    Phone: [phone]   · HYDROcodone-acetaminophen 5-325 MG per tablet          Fletcher Khalil MD  03/07/23 8833

## 2023-03-07 NOTE — DISCHARGE INSTRUCTIONS
Nonweight bearing left leg pending podiatry evaluation  Return ED worse condition, any other concerns

## 2023-03-09 ENCOUNTER — OFFICE VISIT (OUTPATIENT)
Dept: PODIATRY | Facility: CLINIC | Age: 68
End: 2023-03-09
Payer: MEDICARE

## 2023-03-09 VITALS
OXYGEN SATURATION: 99 % | HEIGHT: 64 IN | WEIGHT: 160 LBS | HEART RATE: 72 BPM | SYSTOLIC BLOOD PRESSURE: 124 MMHG | BODY MASS INDEX: 27.31 KG/M2 | DIASTOLIC BLOOD PRESSURE: 64 MMHG

## 2023-03-09 DIAGNOSIS — Z78.0 POST-MENOPAUSAL: ICD-10-CM

## 2023-03-09 DIAGNOSIS — S82.65XA CLOSED NONDISPLACED FRACTURE OF LATERAL MALLEOLUS OF LEFT FIBULA, INITIAL ENCOUNTER: Primary | ICD-10-CM

## 2023-03-09 DIAGNOSIS — M25.572 ACUTE LEFT ANKLE PAIN: ICD-10-CM

## 2023-03-09 PROCEDURE — 99214 OFFICE O/P EST MOD 30 MIN: CPT | Performed by: NURSE PRACTITIONER

## 2023-03-09 PROCEDURE — 27786 TREATMENT OF ANKLE FRACTURE: CPT | Performed by: NURSE PRACTITIONER

## 2023-03-09 RX ORDER — HYDROCODONE BITARTRATE AND ACETAMINOPHEN 5; 325 MG/1; MG/1
TABLET ORAL
Qty: 42 TABLET | Refills: 0 | Status: SHIPPED | OUTPATIENT
Start: 2023-03-09

## 2023-03-09 NOTE — PROGRESS NOTES
Anna Carvalho  1955  67 y.o. female        03/09/2023    Chief Complaint   Patient presents with   • Left Foot - Fracture       History of Present Illness    Anna Carvalho is a 67 y.o.female came to clinic for concern of left foot injury. Xray obtained on 3/8/2023.    67-year-old  female in the office today for follow-up evaluation from the emergency department for evaluation of her left ankle injury which occurred on March 8, 2022.  The patient reports that she rolled her ankle and since then she did not experience lateral ankle pain, limited range of motion, bruising and diffuse swelling.  She denies any previous injury to the left ankle.  She denies any fever, chills or evidence of infection.  She was placed in a short cam walker boot and given some medication for discomfort which has improved her pain.    Past Medical History:   Diagnosis Date   • COPD (chronic obstructive pulmonary disease) (HCC)    • Disease of thyroid gland    • Migraine    • Sleep apnea          Past Surgical History:   Procedure Laterality Date   • APPENDECTOMY     • CHOLECYSTECTOMY     • COLONOSCOPY N/A 4/3/2018    Procedure: COLONOSCOPY;  Surgeon: Jean Morales DO;  Location: [facility] ENDOSCOPY;  Service: Gastroenterology   • HYSTERECTOMY     • SHOULDER SURGERY  2018         Family History   Adopted: Yes       Allergies   Allergen Reactions   • Biaxin [Clarithromycin] GI Intolerance   • Hydrocodone-Acetaminophen Nausea Only   • Penicillins Other (See Comments)     childhood   • Ceclor [Cefaclor] Rash   • Keflex [Cephalexin] Rash   • Other Rash     ivp dye   • Reglan [Metoclopramide] Rash       Social History     Socioeconomic History   • Marital status:    Tobacco Use   • Smoking status: Every Day     Packs/day: 1.00     Types: Cigarettes   • Smokeless tobacco: Never   Vaping Use   • Vaping Use: Never used   Substance and Sexual Activity   • Alcohol use: No     Comment: none in 10 years   • Drug use:  "Never   • Sexual activity: Defer         Current Outpatient Medications   Medication Sig Dispense Refill   • albuterol sulfate HFA (Ventolin HFA) 108 (90 Base) MCG/ACT inhaler Inhale 2 puffs Every 4 (Four) Hours As Needed for Wheezing or Shortness of Air. 18 g 5   • Budeson-Glycopyrrol-Formoterol (Breztri Aerosphere) 160-9-4.8 MCG/ACT aerosol inhaler Inhale 2 puffs 2 (Two) Times a Day. 5.9 g 11   • clotrimazole-betamethasone (LOTRISONE) 1-0.05 % lotion Apply to ears twice a day as needed for itching 30 mL 2   • HYDROcodone-acetaminophen (NORCO) 5-325 MG per tablet 1 tablet every four to six hours prn pain 42 tablet 0   • hydrOXYzine pamoate (Vistaril) 25 MG capsule Take 1 capsule by mouth Every 6 (Six) Hours As Needed for Anxiety. 60 capsule 1   • meclizine (ANTIVERT) 25 MG tablet Take 1 tablet by mouth 3 (Three) Times a Day As Needed for Dizziness. 30 tablet 2   • traZODone (DESYREL) 50 MG tablet Take 1 tablet by mouth Every Night. 30 tablet 8   • verapamil ER (VERELAN) 120 MG 24 hr capsule Take 1 capsule by mouth Daily.     • vitamin D (ERGOCALCIFEROL) 66301 units capsule capsule Take 1 capsule by mouth Every 14 (Fourteen) Days.       No current facility-administered medications for this visit.       Review of Systems   Musculoskeletal:        Foot pain    All other systems reviewed and are negative.        OBJECTIVE    /64   Pulse 72   Ht 162.6 cm (64\")   Wt 72.6 kg (160 lb)   SpO2 99%   BMI 27.46 kg/m²     Body mass index is 27.46 kg/m².        Physical Exam  Vitals reviewed.   Constitutional:       Appearance: Normal appearance. She is well-developed.   HENT:      Head: Normocephalic and atraumatic.   Neck:      Trachea: Trachea and phonation normal.   Cardiovascular:      Pulses:           Dorsalis pedis pulses are 2+ on the right side and 2+ on the left side.        Posterior tibial pulses are 2+ on the right side and 2+ on the left side.   Pulmonary:      Effort: Pulmonary effort is normal. No " respiratory distress.   Abdominal:      General: There is no distension.      Palpations: Abdomen is soft.   Musculoskeletal:      Right foot: Normal range of motion.      Left foot: Decreased range of motion.        Feet:    Feet:      Right foot:      Toenail Condition: Right toenails are normal.      Left foot:      Toenail Condition: Left toenails are normal.   Skin:     General: Skin is warm and dry.   Neurological:      Mental Status: She is alert and oriented to person, place, and time.      GCS: GCS eye subscore is 4. GCS verbal subscore is 5. GCS motor subscore is 6.   Psychiatric:         Speech: Speech normal.         Behavior: Behavior normal. Behavior is cooperative.         Thought Content: Thought content normal.         Judgment: Judgment normal.                Procedures        ASSESSMENT AND PLAN    Diagnoses and all orders for this visit:    1. Closed nondisplaced fracture of lateral malleolus of left fibula, initial encounter (Primary)  -     Walker  -     DEXA Bone Density Axial; Future    2. Acute left ankle pain  -     HYDROcodone-acetaminophen (NORCO) 5-325 MG per tablet; 1 tablet every four to six hours prn pain  Dispense: 42 tablet; Refill: 0  -     DEXA Bone Density Axial; Future    3. Post-menopausal  -     DEXA Bone Density Axial; Future      Body mass index is 27.46 kg/m².    Recommend follow-up with primary care to discuss BMI greater than 30    Risk benefits and treatment options were discussed with the patient in detail we proceeded with conservative management of distal fibula fracture in a cam walker boot.  She was given a prescription for a walker for modified weightbearing, we discussed swelling modalities and she was wrapped with an Ace wrap.  Prescription was called in for pain medication to help control her discomfort.  She will follow-up in 1 week for repeat x-rays of the ankle in the meantime contact the office for any worsening symptoms.    We will continue also with a bone  density to further evaluate her bone health.  She is a postmenopausal female over the age of 61.          This document has been electronically signed by MARK SauerPZACHARY, ONP-C on March 9, 2023 14:08 CST

## 2023-03-16 ENCOUNTER — OFFICE VISIT (OUTPATIENT)
Dept: PODIATRY | Facility: CLINIC | Age: 68
End: 2023-03-16
Payer: MEDICARE

## 2023-03-16 VITALS
BODY MASS INDEX: 27.31 KG/M2 | HEART RATE: 76 BPM | WEIGHT: 160 LBS | DIASTOLIC BLOOD PRESSURE: 70 MMHG | SYSTOLIC BLOOD PRESSURE: 115 MMHG | HEIGHT: 64 IN | OXYGEN SATURATION: 99 %

## 2023-03-16 DIAGNOSIS — S82.65XA CLOSED NONDISPLACED FRACTURE OF LATERAL MALLEOLUS OF LEFT FIBULA, INITIAL ENCOUNTER: Primary | ICD-10-CM

## 2023-03-16 PROCEDURE — 99024 POSTOP FOLLOW-UP VISIT: CPT | Performed by: NURSE PRACTITIONER

## 2023-03-16 NOTE — PROGRESS NOTES
Anna Carvalho  1955  67 y.o. female    3/16/2023    Chief Complaint   Patient presents with   • Left Ankle - Fracture       History of Present Illness    Anna Carvalho is a 67 y.o.female return to clinic for a two week follow up appointment on left ankle fracture. Xray obtained today. Patient seems to be doing well.         Past Medical History:   Diagnosis Date   • COPD (chronic obstructive pulmonary disease) (HCC)    • Disease of thyroid gland    • Migraine    • Sleep apnea          Past Surgical History:   Procedure Laterality Date   • APPENDECTOMY     • CHOLECYSTECTOMY     • COLONOSCOPY N/A 4/3/2018    Procedure: COLONOSCOPY;  Surgeon: Jean Morales DO;  Location: [facility] ENDOSCOPY;  Service: Gastroenterology   • HYSTERECTOMY     • SHOULDER SURGERY  2018         Family History   Adopted: Yes       Allergies   Allergen Reactions   • Biaxin [Clarithromycin] GI Intolerance   • Hydrocodone-Acetaminophen Nausea Only   • Penicillins Other (See Comments)     childhood   • Ceclor [Cefaclor] Rash   • Keflex [Cephalexin] Rash   • Other Rash     ivp dye   • Reglan [Metoclopramide] Rash       Social History     Socioeconomic History   • Marital status:    Tobacco Use   • Smoking status: Every Day     Packs/day: 1.00     Types: Cigarettes   • Smokeless tobacco: Never   Vaping Use   • Vaping Use: Never used   Substance and Sexual Activity   • Alcohol use: No     Comment: none in 10 years   • Drug use: Never   • Sexual activity: Defer         Current Outpatient Medications   Medication Sig Dispense Refill   • albuterol sulfate HFA (Ventolin HFA) 108 (90 Base) MCG/ACT inhaler Inhale 2 puffs Every 4 (Four) Hours As Needed for Wheezing or Shortness of Air. 18 g 5   • Budeson-Glycopyrrol-Formoterol (Breztri Aerosphere) 160-9-4.8 MCG/ACT aerosol inhaler Inhale 2 puffs 2 (Two) Times a Day. 5.9 g 11   • clotrimazole-betamethasone (LOTRISONE) 1-0.05 % lotion Apply to ears twice a day as needed for  "itching 30 mL 2   • HYDROcodone-acetaminophen (NORCO) 5-325 MG per tablet 1 tablet every four to six hours prn pain 42 tablet 0   • hydrOXYzine pamoate (Vistaril) 25 MG capsule Take 1 capsule by mouth Every 6 (Six) Hours As Needed for Anxiety. 60 capsule 1   • meclizine (ANTIVERT) 25 MG tablet Take 1 tablet by mouth 3 (Three) Times a Day As Needed for Dizziness. 30 tablet 2   • traZODone (DESYREL) 50 MG tablet Take 1 tablet by mouth Every Night. 30 tablet 8   • verapamil ER (VERELAN) 120 MG 24 hr capsule Take 1 capsule by mouth Daily.     • vitamin D (ERGOCALCIFEROL) 80647 units capsule capsule Take 1 capsule by mouth Every 14 (Fourteen) Days.       No current facility-administered medications for this visit.       Review of Systems   Musculoskeletal:        Foot pain    All other systems reviewed and are negative.        OBJECTIVE    /70   Pulse 76   Ht 162.6 cm (64\")   Wt 72.6 kg (160 lb)   SpO2 99%   BMI 27.46 kg/m²     Body mass index is 27.46 kg/m².        Physical Exam  Vitals reviewed.   Constitutional:       Appearance: Normal appearance. She is well-developed.   HENT:      Head: Normocephalic and atraumatic.   Neck:      Trachea: Trachea and phonation normal.   Cardiovascular:      Pulses:           Dorsalis pedis pulses are 2+ on the right side and 2+ on the left side.        Posterior tibial pulses are 2+ on the right side and 2+ on the left side.   Pulmonary:      Effort: Pulmonary effort is normal. No respiratory distress.   Abdominal:      General: There is no distension.      Palpations: Abdomen is soft.   Musculoskeletal:      Right foot: Normal range of motion.      Left foot: Decreased range of motion.        Feet:    Feet:      Right foot:      Toenail Condition: Right toenails are normal.      Left foot:      Toenail Condition: Left toenails are normal.   Skin:     General: Skin is warm and dry.   Neurological:      Mental Status: She is alert and oriented to person, place, and time. "      GCS: GCS eye subscore is 4. GCS verbal subscore is 5. GCS motor subscore is 6.   Psychiatric:         Speech: Speech normal.         Behavior: Behavior normal. Behavior is cooperative.         Thought Content: Thought content normal.         Judgment: Judgment normal.                Procedures        ASSESSMENT AND PLAN    Diagnoses and all orders for this visit:    1. Closed nondisplaced fracture of lateral malleolus of left fibula, initial encounter (Primary)  -     XR Ankle 3+ View Left      Body mass index is 27.46 kg/m².    Recommend follow-up with primary care to discuss BMI greater than 30    Reviewed x-ray today, there is a stable avulsion injury off the distal tip of the lateral malleolus with no other acute radiological abnormality noted.  After some discussion we will continue rest of range of motion exercises to improve her foot control and discussed possible transitioning out of the cam walker boot in 2 weeks at her follow-up.  She verbalized understanding of plan of care will contact the office in the meantime for any worsening symptoms.          This document has been electronically signed by Evgeny MIRELES, FNP-C, ONP-C on March 16, 2023 15:49 CDT

## 2023-03-30 ENCOUNTER — OFFICE VISIT (OUTPATIENT)
Dept: PODIATRY | Facility: CLINIC | Age: 68
End: 2023-03-30
Payer: MEDICARE

## 2023-03-30 VITALS
BODY MASS INDEX: 27.31 KG/M2 | DIASTOLIC BLOOD PRESSURE: 69 MMHG | HEART RATE: 80 BPM | HEIGHT: 64 IN | WEIGHT: 160 LBS | OXYGEN SATURATION: 98 % | SYSTOLIC BLOOD PRESSURE: 122 MMHG

## 2023-03-30 DIAGNOSIS — S82.65XA CLOSED NONDISPLACED FRACTURE OF LATERAL MALLEOLUS OF LEFT FIBULA, INITIAL ENCOUNTER: Primary | ICD-10-CM

## 2023-03-30 DIAGNOSIS — M25.572 ACUTE LEFT ANKLE PAIN: ICD-10-CM

## 2023-03-30 PROCEDURE — 99024 POSTOP FOLLOW-UP VISIT: CPT | Performed by: NURSE PRACTITIONER

## 2023-03-30 NOTE — PROGRESS NOTES
Anna Carvalho  1955  67 y.o. female    3/30/2023    Chief Complaint   Patient presents with   • Left Lower Leg - Follow-up       History of Present Illness    Anna Carvalho is a 67 y.o.female return to clinic for a two week follow up appointment on left ankle fracture.  Patient is overall improving since the last evaluation is accompanied by her  and currently denies any fever, chills or evidence of infection.        Past Medical History:   Diagnosis Date   • COPD (chronic obstructive pulmonary disease) (HCC)    • Disease of thyroid gland    • Migraine    • Sleep apnea          Past Surgical History:   Procedure Laterality Date   • APPENDECTOMY     • CHOLECYSTECTOMY     • COLONOSCOPY N/A 4/3/2018    Procedure: COLONOSCOPY;  Surgeon: Jean Morales DO;  Location: [facility] ENDOSCOPY;  Service: Gastroenterology   • HYSTERECTOMY     • SHOULDER SURGERY  2018         Family History   Adopted: Yes       Allergies   Allergen Reactions   • Biaxin [Clarithromycin] GI Intolerance   • Hydrocodone-Acetaminophen Nausea Only   • Penicillins Other (See Comments)     childhood   • Ceclor [Cefaclor] Rash   • Keflex [Cephalexin] Rash   • Other Rash     ivp dye   • Reglan [Metoclopramide] Rash       Social History     Socioeconomic History   • Marital status:    Tobacco Use   • Smoking status: Every Day     Packs/day: 1.00     Types: Cigarettes   • Smokeless tobacco: Never   Vaping Use   • Vaping Use: Never used   Substance and Sexual Activity   • Alcohol use: No     Comment: none in 10 years   • Drug use: Never   • Sexual activity: Defer         Current Outpatient Medications   Medication Sig Dispense Refill   • albuterol sulfate HFA (Ventolin HFA) 108 (90 Base) MCG/ACT inhaler Inhale 2 puffs Every 4 (Four) Hours As Needed for Wheezing or Shortness of Air. 18 g 5   • Budeson-Glycopyrrol-Formoterol (Breztri Aerosphere) 160-9-4.8 MCG/ACT aerosol inhaler Inhale 2 puffs 2 (Two) Times a Day. 5.9 g 11   •  "clotrimazole-betamethasone (LOTRISONE) 1-0.05 % lotion Apply to ears twice a day as needed for itching 30 mL 2   • HYDROcodone-acetaminophen (NORCO) 5-325 MG per tablet 1 tablet every four to six hours prn pain 42 tablet 0   • hydrOXYzine pamoate (Vistaril) 25 MG capsule Take 1 capsule by mouth Every 6 (Six) Hours As Needed for Anxiety. 60 capsule 1   • meclizine (ANTIVERT) 25 MG tablet Take 1 tablet by mouth 3 (Three) Times a Day As Needed for Dizziness. 30 tablet 2   • traZODone (DESYREL) 50 MG tablet Take 1 tablet by mouth Every Night. 30 tablet 8   • verapamil ER (VERELAN) 120 MG 24 hr capsule Take 1 capsule by mouth Daily.     • vitamin D (ERGOCALCIFEROL) 57774 units capsule capsule Take 1 capsule by mouth Every 14 (Fourteen) Days.       No current facility-administered medications for this visit.       Review of Systems   Musculoskeletal:        Foot pain    All other systems reviewed and are negative.        OBJECTIVE    /69   Pulse 80   Ht 162.6 cm (64\")   Wt 72.6 kg (160 lb)   SpO2 98%   BMI 27.46 kg/m²     Body mass index is 27.46 kg/m².        Physical Exam  Vitals reviewed.   Constitutional:       Appearance: Normal appearance. She is well-developed.   HENT:      Head: Normocephalic and atraumatic.   Neck:      Trachea: Trachea and phonation normal.   Cardiovascular:      Pulses:           Dorsalis pedis pulses are 2+ on the right side and 2+ on the left side.        Posterior tibial pulses are 2+ on the right side and 2+ on the left side.   Pulmonary:      Effort: Pulmonary effort is normal. No respiratory distress.   Abdominal:      General: There is no distension.      Palpations: Abdomen is soft.   Musculoskeletal:      Right foot: Normal range of motion.      Left foot: Decreased range of motion.        Feet:    Feet:      Right foot:      Toenail Condition: Right toenails are normal.      Left foot:      Toenail Condition: Left toenails are normal.   Skin:     General: Skin is warm and " dry.   Neurological:      Mental Status: She is alert and oriented to person, place, and time.      GCS: GCS eye subscore is 4. GCS verbal subscore is 5. GCS motor subscore is 6.   Psychiatric:         Speech: Speech normal.         Behavior: Behavior normal. Behavior is cooperative.         Thought Content: Thought content normal.         Judgment: Judgment normal.                Procedures        ASSESSMENT AND PLAN    Diagnoses and all orders for this visit:    1. Closed nondisplaced fracture of lateral malleolus of left fibula, initial encounter (Primary)    2. Acute left ankle pain      Body mass index is 27.46 kg/m².    Recommend follow-up with primary care to discuss BMI greater than 30    Continue to progress range of motion activity as tolerated based on pain follow-up in 2 to 4 weeks for recheck unless symptoms worsen contact the office for any worsening symptoms          This document has been electronically signed by Egveny MIRELES, FNP-C, ONP-C on March 30, 2023 19:56 CDT

## 2023-04-27 ENCOUNTER — OFFICE VISIT (OUTPATIENT)
Dept: PULMONOLOGY | Facility: CLINIC | Age: 68
End: 2023-04-27
Payer: MEDICARE

## 2023-04-27 VITALS
DIASTOLIC BLOOD PRESSURE: 76 MMHG | SYSTOLIC BLOOD PRESSURE: 136 MMHG | WEIGHT: 160 LBS | OXYGEN SATURATION: 98 % | BODY MASS INDEX: 27.31 KG/M2 | HEIGHT: 64 IN | HEART RATE: 87 BPM

## 2023-04-27 DIAGNOSIS — J44.9 MODERATE COPD (CHRONIC OBSTRUCTIVE PULMONARY DISEASE): Primary | ICD-10-CM

## 2023-04-27 DIAGNOSIS — F17.210 CIGARETTE SMOKER: ICD-10-CM

## 2023-04-27 DIAGNOSIS — G47.33 OSA (OBSTRUCTIVE SLEEP APNEA): ICD-10-CM

## 2023-04-27 RX ORDER — BUPROPION HYDROCHLORIDE 150 MG/1
150 TABLET ORAL DAILY
Qty: 90 TABLET | Refills: 3 | Status: SHIPPED | OUTPATIENT
Start: 2023-04-27

## 2023-04-27 RX ORDER — BUDESONIDE AND FORMOTEROL FUMARATE DIHYDRATE 160; 4.5 UG/1; UG/1
2 AEROSOL RESPIRATORY (INHALATION)
COMMUNITY
End: 2023-04-27

## 2023-04-27 RX ORDER — BUDESONIDE AND FORMOTEROL FUMARATE DIHYDRATE 160; 4.5 UG/1; UG/1
2 AEROSOL RESPIRATORY (INHALATION) 2 TIMES DAILY
Qty: 6 G | Refills: 11 | Status: SHIPPED | OUTPATIENT
Start: 2023-04-27
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
## 2023-05-01 ENCOUNTER — OFFICE VISIT (OUTPATIENT)
Dept: PODIATRY | Facility: CLINIC | Age: 68
End: 2023-05-01
Payer: MEDICARE

## 2023-05-01 VITALS — WEIGHT: 160 LBS | HEIGHT: 64 IN | BODY MASS INDEX: 27.31 KG/M2 | OXYGEN SATURATION: 98 % | HEART RATE: 54 BPM

## 2023-05-01 DIAGNOSIS — S82.65XA CLOSED NONDISPLACED FRACTURE OF LATERAL MALLEOLUS OF LEFT FIBULA, INITIAL ENCOUNTER: Primary | ICD-10-CM

## 2023-05-01 DIAGNOSIS — M85.80 OSTEOPENIA, UNSPECIFIED LOCATION: ICD-10-CM

## 2023-05-01 NOTE — PROGRESS NOTES
Anna Carvalho  1955  67 y.o. female    5/1/2023    Chief Complaint   Patient presents with   • Left Ankle - Follow-up, Fracture       History of Present Illness    Anna Carvlaho is a 67 y.o.female return to clinic for 1 month follow up appointment on  left ankle fracture.  Patient seems to be doing well. Xray obtained today.     Past Medical History:   Diagnosis Date   • COPD (chronic obstructive pulmonary disease)    • Disease of thyroid gland    • Migraine    • Sleep apnea          Past Surgical History:   Procedure Laterality Date   • APPENDECTOMY     • CHOLECYSTECTOMY     • COLONOSCOPY N/A 4/3/2018    Procedure: COLONOSCOPY;  Surgeon: Jean Morales DO;  Location: [facility] ENDOSCOPY;  Service: Gastroenterology   • HYSTERECTOMY     • SHOULDER SURGERY  2018         Family History   Adopted: Yes       Allergies   Allergen Reactions   • Biaxin [Clarithromycin] GI Intolerance   • Hydrocodone-Acetaminophen Nausea Only   • Penicillins Other (See Comments)     childhood   • Ceclor [Cefaclor] Rash   • Keflex [Cephalexin] Rash   • Other Rash     ivp dye   • Reglan [Metoclopramide] Rash       Social History     Socioeconomic History   • Marital status:    Tobacco Use   • Smoking status: Every Day     Packs/day: 1.00     Years: 50.00     Pack years: 50.00     Types: Cigarettes     Start date: 1972   • Smokeless tobacco: Never   Vaping Use   • Vaping Use: Never used   Substance and Sexual Activity   • Alcohol use: No     Comment: none in 10 years   • Drug use: Never   • Sexual activity: Defer         Current Outpatient Medications   Medication Sig Dispense Refill   • albuterol sulfate HFA (Ventolin HFA) 108 (90 Base) MCG/ACT inhaler Inhale 2 puffs Every 4 (Four) Hours As Needed for Wheezing or Shortness of Air. 18 g 5   • budesonide-formoterol (SYMBICORT) 160-4.5 MCG/ACT inhaler Inhale 2 puffs 2 (Two) Times a Day. 6 g 11   • buPROPion XL (Wellbutrin XL) 150 MG 24 hr tablet Take 1 tablet by mouth  "Daily. 90 tablet 3   • clotrimazole-betamethasone (LOTRISONE) 1-0.05 % lotion Apply to ears twice a day as needed for itching 30 mL 2   • HYDROcodone-acetaminophen (NORCO) 5-325 MG per tablet 1 tablet every four to six hours prn pain 42 tablet 0   • hydrOXYzine pamoate (Vistaril) 25 MG capsule Take 1 capsule by mouth Every 6 (Six) Hours As Needed for Anxiety. 60 capsule 1   • meclizine (ANTIVERT) 25 MG tablet Take 1 tablet by mouth 3 (Three) Times a Day As Needed for Dizziness. 30 tablet 2   • traZODone (DESYREL) 50 MG tablet Take 1 tablet by mouth Every Night. 30 tablet 8   • verapamil ER (VERELAN) 120 MG 24 hr capsule Take 1 capsule by mouth Daily.     • vitamin D (ERGOCALCIFEROL) 24801 units capsule capsule Take 1 capsule by mouth Every 14 (Fourteen) Days.       No current facility-administered medications for this visit.       Review of Systems   Musculoskeletal:        Foot pain    All other systems reviewed and are negative.        OBJECTIVE    Pulse 54   Ht 162.6 cm (64\")   Wt 72.6 kg (160 lb)   SpO2 98%   BMI 27.46 kg/m²     Body mass index is 27.46 kg/m².        Physical Exam  Vitals reviewed.   Constitutional:       Appearance: Normal appearance. She is well-developed.   HENT:      Head: Normocephalic and atraumatic.   Neck:      Trachea: Trachea and phonation normal.   Cardiovascular:      Pulses:           Dorsalis pedis pulses are 2+ on the right side and 2+ on the left side.        Posterior tibial pulses are 2+ on the right side and 2+ on the left side.   Pulmonary:      Effort: Pulmonary effort is normal. No respiratory distress.   Abdominal:      General: There is no distension.      Palpations: Abdomen is soft.   Musculoskeletal:      Right foot: Normal range of motion.      Left foot: Decreased range of motion.        Feet:    Feet:      Right foot:      Skin integrity: Skin integrity normal.      Toenail Condition: Right toenails are normal.      Left foot:      Skin integrity: Skin integrity " normal.      Toenail Condition: Left toenails are normal.   Skin:     General: Skin is warm and dry.   Neurological:      Mental Status: She is alert and oriented to person, place, and time.      GCS: GCS eye subscore is 4. GCS verbal subscore is 5. GCS motor subscore is 6.   Psychiatric:         Speech: Speech normal.         Behavior: Behavior normal. Behavior is cooperative.         Thought Content: Thought content normal.         Judgment: Judgment normal.          X-rays reviewed    DEXA Bone Density Axial    Result Date: 4/17/2023  Narrative: Dual-Energy X-ray Absorptiometry (DEXA) INDICATION: Bone mineral density screening. COMPARISON: None relevant. FINDINGS: PA LUMBAR SPINE: BMD is 0.854 g/cm2 as determined from L1 through L4. T-score is -1.8  (standard deviations of young adult mean). Z-score is 0.1 (standard deviations of age-matched mean). LEFT PROXIMAL FEMUR: BMD is 0.601 g/cm2 as determined from the left femoral neck. T-score is -2.2  (standard deviations of young adult mean). Z-score is -0.6 (standard deviations of age-matched mean). RIGHT PROXIMAL FEMUR: BMD is 0.596 g/cm2 as determined from the right femoral neck. T-score is -2.3  (standard deviations of young adult mean). Z-score is -0.7 (standard deviations of age-matched mean). Utilizing FRAX, patient's ten-year risk of major osteoporotic fracture is 13% and risk for a hip fracture is 3.8%.     Impression: Osteopenia. Consider FDA-approved medical therapies in postmenopausal women and men aged 50 years and older, based on the following: -A hip or vertebral (clinical or morphometric) fracture. -T-score less than or equal to -2.5 at the femoral neck or spine after appropriate evaluation to exclude secondary causes. -Low bone mass (T-score between -1.0 and -2.5 at the femoral neck or spine) and a 10 year probability of a hip fracture of greater than or equal to 3% or a 10 year probability of a major osteoporosis-related fracture greater than or  equal to 20% based on the [place]-adapted WHO algorithm. -Clinicians judgement and/or patient preferences may indicate treatment for people with 10 year fracture probabilities above or below these levels. Link for fracture risk calculator:  https://www.shef.ac.uk/FRAX/ The DEXA scan was performed using a [employer] C unit.           Procedures        ASSESSMENT AND PLAN    Diagnoses and all orders for this visit:    1. Closed nondisplaced fracture of lateral malleolus of left fibula, initial encounter (Primary)  -     XR Ankle 3+ View Left    2. Osteopenia, unspecified location  -     Ambulatory Referral to Orthopedic Surgery      Body mass index is 27.46 kg/m².    Recommend follow-up with primary care to discuss BMI greater than 30    Continue to progress range of motion activity of the left ankle as tolerated based on pain  Follow-up in 1 month for recheck of the ankle  Referral sent to the bone health clinic          This document has been electronically signed by Evgeny MIRELES, FNP-C, ONP-C on May 1, 2023 13:38 CDT

## 2023-05-30 ENCOUNTER — OFFICE VISIT (OUTPATIENT)
Dept: PODIATRY | Facility: CLINIC | Age: 68
End: 2023-05-30

## 2023-05-30 VITALS — BODY MASS INDEX: 27.31 KG/M2 | HEIGHT: 64 IN | WEIGHT: 160 LBS

## 2023-05-30 DIAGNOSIS — S82.65XA CLOSED NONDISPLACED FRACTURE OF LATERAL MALLEOLUS OF LEFT FIBULA, INITIAL ENCOUNTER: Primary | ICD-10-CM

## 2023-05-30 DIAGNOSIS — M85.80 OSTEOPENIA, UNSPECIFIED LOCATION: ICD-10-CM

## 2023-05-30 DIAGNOSIS — M25.572 ACUTE LEFT ANKLE PAIN: ICD-10-CM

## 2023-05-30 DIAGNOSIS — Z78.0 POST-MENOPAUSAL: ICD-10-CM

## 2023-05-30 PROCEDURE — 99024 POSTOP FOLLOW-UP VISIT: CPT | Performed by: NURSE PRACTITIONER

## 2023-05-30 NOTE — PROGRESS NOTES
Anna Carvalho  1955  68 y.o. female    05/30/2023    Chief Complaint   Patient presents with   • Left Foot - Pain, Follow-up       History of Present Illness    Anna Carvalho is a 68 y.o.female return to clinic for 1 month follow up appointment on  left ankle fracture.  Patient seems to be doing well.     Past Medical History:   Diagnosis Date   • COPD (chronic obstructive pulmonary disease)    • Disease of thyroid gland    • Migraine    • Sleep apnea          Past Surgical History:   Procedure Laterality Date   • APPENDECTOMY     • CHOLECYSTECTOMY     • COLONOSCOPY N/A 4/3/2018    Procedure: COLONOSCOPY;  Surgeon: Jean Morales DO;  Location: [facility] ENDOSCOPY;  Service: Gastroenterology   • HYSTERECTOMY     • SHOULDER SURGERY  2018         Family History   Adopted: Yes       Allergies   Allergen Reactions   • Biaxin [Clarithromycin] GI Intolerance   • Hydrocodone-Acetaminophen Nausea Only   • Penicillins Other (See Comments)     childhood   • Ceclor [Cefaclor] Rash   • Keflex [Cephalexin] Rash   • Other Rash     ivp dye   • Reglan [Metoclopramide] Rash       Social History     Socioeconomic History   • Marital status:    Tobacco Use   • Smoking status: Every Day     Packs/day: 1.00     Years: 50.00     Pack years: 50.00     Types: Cigarettes     Start date: 1972   • Smokeless tobacco: Never   Vaping Use   • Vaping Use: Never used   Substance and Sexual Activity   • Alcohol use: No     Comment: none in 10 years   • Drug use: Never   • Sexual activity: Defer         Current Outpatient Medications   Medication Sig Dispense Refill   • albuterol sulfate HFA (Ventolin HFA) 108 (90 Base) MCG/ACT inhaler Inhale 2 puffs Every 4 (Four) Hours As Needed for Wheezing or Shortness of Air. 18 g 5   • budesonide-formoterol (SYMBICORT) 160-4.5 MCG/ACT inhaler Inhale 2 puffs 2 (Two) Times a Day. 6 g 11   • buPROPion XL (Wellbutrin XL) 150 MG 24 hr tablet Take 1 tablet by mouth Daily. 90 tablet 3   •  "clotrimazole-betamethasone (LOTRISONE) 1-0.05 % lotion Apply to ears twice a day as needed for itching 30 mL 2   • HYDROcodone-acetaminophen (NORCO) 5-325 MG per tablet 1 tablet every four to six hours prn pain 42 tablet 0   • hydrOXYzine pamoate (Vistaril) 25 MG capsule Take 1 capsule by mouth Every 6 (Six) Hours As Needed for Anxiety. 60 capsule 1   • meclizine (ANTIVERT) 25 MG tablet Take 1 tablet by mouth 3 (Three) Times a Day As Needed for Dizziness. 30 tablet 2   • traZODone (DESYREL) 50 MG tablet Take 1 tablet by mouth Every Night. 30 tablet 8   • verapamil ER (VERELAN) 120 MG 24 hr capsule Take 1 capsule by mouth Daily.     • vitamin D (ERGOCALCIFEROL) 45948 units capsule capsule Take 1 capsule by mouth Every 14 (Fourteen) Days.       No current facility-administered medications for this visit.       Review of Systems   Musculoskeletal:        Foot pain    All other systems reviewed and are negative.        OBJECTIVE    Ht 162.6 cm (64\")   Wt 72.6 kg (160 lb)   BMI 27.46 kg/m²     Body mass index is 27.46 kg/m².        Physical Exam  Vitals reviewed.   Constitutional:       Appearance: Normal appearance. She is well-developed.   HENT:      Head: Normocephalic and atraumatic.   Neck:      Trachea: Trachea and phonation normal.   Cardiovascular:      Pulses:           Dorsalis pedis pulses are 2+ on the right side and 2+ on the left side.        Posterior tibial pulses are 2+ on the right side and 2+ on the left side.   Pulmonary:      Effort: Pulmonary effort is normal. No respiratory distress.   Abdominal:      General: There is no distension.      Palpations: Abdomen is soft.   Musculoskeletal:      Right foot: Normal range of motion.      Left foot: Normal range of motion.        Feet:    Feet:      Right foot:      Skin integrity: Skin integrity normal.      Toenail Condition: Right toenails are normal.      Left foot:      Skin integrity: Skin integrity normal.      Toenail Condition: Left toenails are " normal.   Skin:     General: Skin is warm and dry.   Neurological:      Mental Status: She is alert and oriented to person, place, and time.      GCS: GCS eye subscore is 4. GCS verbal subscore is 5. GCS motor subscore is 6.   Psychiatric:         Speech: Speech normal.         Behavior: Behavior normal. Behavior is cooperative.         Thought Content: Thought content normal.         Judgment: Judgment normal.          X-rays reviewed    XR Ankle 3+ View Left    Result Date: 5/1/2023  Narrative: COMPARISON: 3/16/2023. FINDINGS: Again seen is a small avulsion fracture involving the distal fibula.  Findings are similar to the patient's previous examination.  No new abnormalities identified.          Procedures        ASSESSMENT AND PLAN    Diagnoses and all orders for this visit:    1. Closed nondisplaced fracture of lateral malleolus of left fibula, initial encounter (Primary)    2. Osteopenia, unspecified location    3. Acute left ankle pain    4. Post-menopausal      Body mass index is 27.46 kg/m².    Recommend follow-up with primary care to discuss BMI greater than 30    Symptoms have resolved.  Will recommend continue progression of range of motion activity as tolerated based on pain follow-up as needed for worsening symptoms          This document has been electronically signed by Evgeny MIRELES, FNP-LISBETH, ONP-C on May 30, 2023 13:13 CDT

## 2023-06-12 ENCOUNTER — OFFICE VISIT (OUTPATIENT)
Dept: ORTHOPEDIC SURGERY | Facility: CLINIC | Age: 68
End: 2023-06-12
Payer: MEDICARE

## 2023-06-12 ENCOUNTER — LAB (OUTPATIENT)
Dept: LAB | Facility: HOSPITAL | Age: 68
End: 2023-06-12
Payer: MEDICARE

## 2023-06-12 VITALS — HEIGHT: 64 IN | WEIGHT: 168 LBS | BODY MASS INDEX: 28.68 KG/M2

## 2023-06-12 DIAGNOSIS — E55.9 VITAMIN D DEFICIENCY: ICD-10-CM

## 2023-06-12 DIAGNOSIS — K58.9 IRRITABLE BOWEL SYNDROME, UNSPECIFIED TYPE: ICD-10-CM

## 2023-06-12 DIAGNOSIS — F17.200 TOBACCO DEPENDENCE: ICD-10-CM

## 2023-06-12 DIAGNOSIS — M25.50 PAIN IN JOINT INVOLVING MULTIPLE SITES: ICD-10-CM

## 2023-06-12 DIAGNOSIS — G89.29 CHRONIC BILATERAL LOW BACK PAIN WITHOUT SCIATICA: Primary | ICD-10-CM

## 2023-06-12 DIAGNOSIS — M25.552 BILATERAL HIP PAIN: ICD-10-CM

## 2023-06-12 DIAGNOSIS — M54.50 CHRONIC BILATERAL LOW BACK PAIN WITHOUT SCIATICA: Primary | ICD-10-CM

## 2023-06-12 DIAGNOSIS — E28.319 EARLY MENOPAUSE OCCURRING IN PATIENT AGE YOUNGER THAN 45 YEARS: ICD-10-CM

## 2023-06-12 DIAGNOSIS — Z78.0 POSTMENOPAUSAL: ICD-10-CM

## 2023-06-12 DIAGNOSIS — J44.9 MODERATE COPD (CHRONIC OBSTRUCTIVE PULMONARY DISEASE): ICD-10-CM

## 2023-06-12 DIAGNOSIS — G89.29 CHRONIC PAIN OF RIGHT KNEE: ICD-10-CM

## 2023-06-12 DIAGNOSIS — F17.210 CIGARETTE SMOKER: ICD-10-CM

## 2023-06-12 DIAGNOSIS — M25.551 BILATERAL HIP PAIN: ICD-10-CM

## 2023-06-12 DIAGNOSIS — Z79.51 LONG TERM (CURRENT) USE OF INHALED STEROIDS: ICD-10-CM

## 2023-06-12 DIAGNOSIS — M25.561 CHRONIC PAIN OF RIGHT KNEE: ICD-10-CM

## 2023-06-12 DIAGNOSIS — M85.89 OSTEOPENIA OF MULTIPLE SITES: ICD-10-CM

## 2023-06-12 LAB
25(OH)D3 SERPL-MCNC: 11.2 NG/ML (ref 30–100)
ALBUMIN SERPL-MCNC: 4.2 G/DL (ref 3.5–5.2)
ALBUMIN/GLOB SERPL: 1.6 G/DL
ALP SERPL-CCNC: 124 U/L (ref 39–117)
ALT SERPL W P-5'-P-CCNC: 14 U/L (ref 1–33)
ANION GAP SERPL CALCULATED.3IONS-SCNC: 9 MMOL/L (ref 5–15)
AST SERPL-CCNC: 21 U/L (ref 1–32)
BASOPHILS # BLD AUTO: 0.06 10*3/MM3 (ref 0–0.2)
BASOPHILS NFR BLD AUTO: 0.5 % (ref 0–1.5)
BILIRUB SERPL-MCNC: 0.4 MG/DL (ref 0–1.2)
BUN SERPL-MCNC: 7 MG/DL (ref 8–23)
BUN/CREAT SERPL: 8.9 (ref 7–25)
CALCIUM SPEC-SCNC: 9 MG/DL (ref 8.6–10.5)
CHLORIDE SERPL-SCNC: 106 MMOL/L (ref 98–107)
CHROMATIN AB SERPL-ACNC: <10 IU/ML (ref 0–14)
CO2 SERPL-SCNC: 26 MMOL/L (ref 22–29)
CREAT SERPL-MCNC: 0.79 MG/DL (ref 0.57–1)
CRP SERPL-MCNC: 0.49 MG/DL (ref 0–0.5)
DEPRECATED RDW RBC AUTO: 39.8 FL (ref 37–54)
EGFRCR SERPLBLD CKD-EPI 2021: 82.1 ML/MIN/1.73
EOSINOPHIL # BLD AUTO: 0.07 10*3/MM3 (ref 0–0.4)
EOSINOPHIL NFR BLD AUTO: 0.6 % (ref 0.3–6.2)
ERYTHROCYTE [DISTWIDTH] IN BLOOD BY AUTOMATED COUNT: 12.9 % (ref 12.3–15.4)
ERYTHROCYTE [SEDIMENTATION RATE] IN BLOOD: 12 MM/HR (ref 0–30)
GLOBULIN UR ELPH-MCNC: 2.6 GM/DL
GLUCOSE SERPL-MCNC: 90 MG/DL (ref 65–99)
HCT VFR BLD AUTO: 44.4 % (ref 34–46.6)
HGB BLD-MCNC: 15.7 G/DL (ref 12–15.9)
IMM GRANULOCYTES # BLD AUTO: 0.03 10*3/MM3 (ref 0–0.05)
IMM GRANULOCYTES NFR BLD AUTO: 0.3 % (ref 0–0.5)
LYMPHOCYTES # BLD AUTO: 3.76 10*3/MM3 (ref 0.7–3.1)
LYMPHOCYTES NFR BLD AUTO: 34.2 % (ref 19.6–45.3)
MCH RBC QN AUTO: 30.5 PG (ref 26.6–33)
MCHC RBC AUTO-ENTMCNC: 35.4 G/DL (ref 31.5–35.7)
MCV RBC AUTO: 86.4 FL (ref 79–97)
MONOCYTES # BLD AUTO: 0.74 10*3/MM3 (ref 0.1–0.9)
MONOCYTES NFR BLD AUTO: 6.7 % (ref 5–12)
NEUTROPHILS NFR BLD AUTO: 57.7 % (ref 42.7–76)
NEUTROPHILS NFR BLD AUTO: 6.32 10*3/MM3 (ref 1.7–7)
NRBC BLD AUTO-RTO: 0 /100 WBC (ref 0–0.2)
PLATELET # BLD AUTO: 222 10*3/MM3 (ref 140–450)
PMV BLD AUTO: 12.7 FL (ref 6–12)
POTASSIUM SERPL-SCNC: 4.2 MMOL/L (ref 3.5–5.2)
PROT SERPL-MCNC: 6.8 G/DL (ref 6–8.5)
RBC # BLD AUTO: 5.14 10*6/MM3 (ref 3.77–5.28)
SODIUM SERPL-SCNC: 141 MMOL/L (ref 136–145)
URATE SERPL-MCNC: 6 MG/DL (ref 2.4–5.7)
WBC NRBC COR # BLD: 10.98 10*3/MM3 (ref 3.4–10.8)

## 2023-06-12 PROCEDURE — 82306 VITAMIN D 25 HYDROXY: CPT

## 2023-06-12 PROCEDURE — 84550 ASSAY OF BLOOD/URIC ACID: CPT

## 2023-06-12 PROCEDURE — 85652 RBC SED RATE AUTOMATED: CPT

## 2023-06-12 PROCEDURE — 86431 RHEUMATOID FACTOR QUANT: CPT

## 2023-06-12 PROCEDURE — 80053 COMPREHEN METABOLIC PANEL: CPT

## 2023-06-12 PROCEDURE — 86038 ANTINUCLEAR ANTIBODIES: CPT

## 2023-06-12 PROCEDURE — 86140 C-REACTIVE PROTEIN: CPT

## 2023-06-12 PROCEDURE — 36415 COLL VENOUS BLD VENIPUNCTURE: CPT

## 2023-06-12 PROCEDURE — 85025 COMPLETE CBC W/AUTO DIFF WBC: CPT

## 2023-06-12 RX ORDER — TRIAMCINOLONE ACETONIDE 40 MG/ML
80 INJECTION, SUSPENSION INTRA-ARTICULAR; INTRAMUSCULAR ONCE
Status: COMPLETED | OUTPATIENT
Start: 2023-06-12 | End: 2023-06-12

## 2023-06-12 RX ORDER — KETOROLAC TROMETHAMINE 30 MG/ML
60 INJECTION, SOLUTION INTRAMUSCULAR; INTRAVENOUS ONCE
Status: COMPLETED | OUTPATIENT
Start: 2023-06-12 | End: 2023-06-12

## 2023-06-12 RX ORDER — HYDROCODONE BITARTRATE AND ACETAMINOPHEN 5; 325 MG/1; MG/1
1 TABLET ORAL EVERY 8 HOURS PRN
Qty: 30 TABLET | Refills: 0 | Status: SHIPPED | OUTPATIENT
Start: 2023-06-12 | End: 2023-06-22

## 2023-06-12 RX ADMIN — KETOROLAC TROMETHAMINE 60 MG: 30 INJECTION, SOLUTION INTRAMUSCULAR; INTRAVENOUS at 11:56

## 2023-06-12 RX ADMIN — TRIAMCINOLONE ACETONIDE 80 MG: 40 INJECTION, SUSPENSION INTRA-ARTICULAR; INTRAMUSCULAR at 11:57

## 2023-06-13 LAB — ANA SER QL IF: NEGATIVE

## 2023-06-19 LAB — HLA-B27 QL NAA+PROBE: NEGATIVE

## 2023-08-11 ENCOUNTER — OFFICE VISIT (OUTPATIENT)
Dept: SLEEP MEDICINE | Facility: HOSPITAL | Age: 68
End: 2023-08-11
Payer: MEDICARE

## 2023-08-11 VITALS
HEART RATE: 73 BPM | WEIGHT: 160.7 LBS | DIASTOLIC BLOOD PRESSURE: 72 MMHG | SYSTOLIC BLOOD PRESSURE: 151 MMHG | HEIGHT: 64 IN | BODY MASS INDEX: 27.43 KG/M2 | OXYGEN SATURATION: 95 %

## 2023-08-11 DIAGNOSIS — G25.81 RESTLESS LEGS SYNDROME (RLS): ICD-10-CM

## 2023-08-11 DIAGNOSIS — G47.33 OSA (OBSTRUCTIVE SLEEP APNEA): Primary | ICD-10-CM

## 2023-08-11 NOTE — PROGRESS NOTES
Sleep Clinic Follow Up    Date: 8/11/2023  Primary Care Provider: Dwain Reyna MD    Last office visit: 08/10/2022 (I reviewed this note)    CC: Follow up: ILANA on CPAP, annual       Interim History:  Since the last visit:    1) moderate ILANA -  Anna Carvalho has remained compliant with CPAP. She denies mask and machine issues, dry mouth, headaches, or pressures intolerance. She denies abnormal dreams, sleep paralysis, nasal congestion, URI sx.    Doing well with CPAP. States sleeping well. No longer taking trazodone.       2) Patient reports occasional RLS symptoms.     Sleep Testing:    PSG on 08/24/2010, AHI of 26.5   CPAP titration on 09/16/2010, recommended 11 cm H2O   Currently on 11 cm H2O    PAP Data:    Time frame: 08/11/2022-08/10/2023   Compliance: 100 %  Average use on days used: 9hrs 22 min  Percent of days with usage greater than or equal to 4 hours: 100%  PAP range: 11 cm H2O  Average 90% pressure: 11 cmH2O  Leak: 0 minutes  Average AHI: 0.6 events/hr  Machine: 3B with card   Mask type: Nasal mask  DME: [facility]     Bed time: 2200  Sleep latency: 15 minutes  Number of times awakens during the night: 1  Wake time: 0630  Estimated total sleep time at night: 7 hours  Caffeine intake: 0 cups of coffee, 0 cups of tea, and 3 sodas per day  Alcohol intake: 0 drinks per week  Nap time: rare   Sleepiness with Driving: denies        [place] Sleepiness Scale Score: 8    How likely are you to doze off or fall asleep in the following situation, in contrast to feeling just tired?     Use the following scale to choose the most appropriate number for each situation:    0 = would never doze  1 = slight chance of dozing   2 = moderate chance of dozing   3 = high chance of dozing    It is important that you answer each question as best you can.      Situation       Chance of Dozing (0-3)    Sitting and reading            ___2____    Watching TV          ___2____    Sitting, inactive in a public place (e.g. a  theatre or meeting)   ___1____    As a passenger in a car for an hour without break    ___0____    Lying down to rest in the afternoon, when circumstances permit  ___3____    Sitting and talking to someone      ___0____    Sitting quietly after a lunch without alcohol     ___0____    In a car, while stopped for a few minutes in traffic    ___0____         PMHx, FH, SH reviewed and pertinent changes are: fell and broke ankle, diagnosed with osteopenia, back to work part time, stopped trazodone         REVIEW OF SYSTEMS:   Negative for chest pain, SOA, fever, chills, cough, N/V/D, abdominal pain.    Smokin-2 ppd    Anna Carvalho  reports that she has been smoking cigarettes. She started smoking about 51 years ago. She has a 50.00 pack-year smoking history. She has never used smokeless tobacco.. I have educated her on the risk of diseases from using tobacco products such as cancer, COPD, and heart disease.     I advised her to quit and she states she is trying.          Exam:  Vitals:    23 0942   BP: 151/72   Pulse: 73   SpO2: 95%           23  0942   Weight: 72.9 kg (160 lb 11.2 oz)     Body mass index is 27.57 kg/mý.  BMI is >= 25 and <30. (Overweight) The following options were offered after discussion;: nutrition counseling/recommendations       Gen:                No distress, conversant, pleasant, appears stated age, alert, oriented  Eyes:               Anicteric sclera, moist conjunctiva, no lid lag                           EOMI   Lungs:             normal effort, non-labored breathing                          Clear to auscultation bilaterally          CV:                  Normal S1/S2, no murmur                          no lower extremity edema                 Psych:             Appropriate affect  Neuro:             CN 2-12 appear intact    Past Medical History:   Diagnosis Date    COPD (chronic obstructive pulmonary disease)     Disease of thyroid gland     Migraine     Sleep apnea         Current Outpatient Medications:     albuterol sulfate HFA (Ventolin HFA) 108 (90 Base) MCG/ACT inhaler, Inhale 2 puffs Every 4 (Four) Hours As Needed for Wheezing or Shortness of Air., Disp: 18 g, Rfl: 5    budesonide-formoterol (SYMBICORT) 160-4.5 MCG/ACT inhaler, Inhale 2 puffs 2 (Two) Times a Day., Disp: 6 g, Rfl: 11    clotrimazole-betamethasone (LOTRISONE) 1-0.05 % lotion, Apply to ears twice a day as needed for itching, Disp: 30 mL, Rfl: 2    hydrOXYzine pamoate (Vistaril) 25 MG capsule, Take 1 capsule by mouth Every 6 (Six) Hours As Needed for Anxiety., Disp: 60 capsule, Rfl: 1    meclizine (ANTIVERT) 25 MG tablet, Take 1 tablet by mouth 3 (Three) Times a Day As Needed for Dizziness., Disp: 30 tablet, Rfl: 2    verapamil ER (VERELAN) 120 MG 24 hr capsule, Take 1 capsule by mouth Daily., Disp: , Rfl:     vitamin D (ERGOCALCIFEROL) 72036 units capsule capsule, Take 1 capsule by mouth Every 14 (Fourteen) Days., Disp: , Rfl:     buPROPion XL (Wellbutrin XL) 150 MG 24 hr tablet, Take 1 tablet by mouth Daily. (Patient not taking: Reported on 6/26/2023), Disp: 90 tablet, Rfl: 3  WBC   Date Value Ref Range Status   06/12/2023 10.98 (H) 3.40 - 10.80 10*3/mm3 Final   08/17/2018 13.4 (H) 4.8 - 10.8 10*9/L Final     RBC   Date Value Ref Range Status   06/12/2023 5.14 3.77 - 5.28 10*6/mm3 Final   08/17/2018 5.46 (H) 4.2 - 5.4 10*12/L Final     Hemoglobin   Date Value Ref Range Status   06/12/2023 15.7 12.0 - 15.9 g/dL Final   08/17/2018 16.5 (H) 12.0 - 16.0 g/dL Final     Hematocrit   Date Value Ref Range Status   06/12/2023 44.4 34.0 - 46.6 % Final   08/17/2018 48.4 (H) 36 - 48 % Final     MCV   Date Value Ref Range Status   06/12/2023 86.4 79.0 - 97.0 fL Final   08/17/2018 88.6 80 - 100 fL Final     MCH   Date Value Ref Range Status   06/12/2023 30.5 26.6 - 33.0 pg Final   08/17/2018 30.1 26 - 34 pg Final     MCHC   Date Value Ref Range Status   06/12/2023 35.4 31.5 - 35.7 g/dL Final   08/17/2018 34.0 31 - 36 g/dL  Final     RDW   Date Value Ref Range Status   06/12/2023 12.9 12.3 - 15.4 % Final   08/17/2018 13.4 11.5 - 14.5 % Final     RDW-SD   Date Value Ref Range Status   06/12/2023 39.8 37.0 - 54.0 fl Final     MPV   Date Value Ref Range Status   06/12/2023 12.7 (H) 6.0 - 12.0 fL Final   08/17/2018 10.3 7.4 - 10.4 fL Final     Platelets   Date Value Ref Range Status   06/12/2023 222 140 - 450 10*3/mm3 Final   08/17/2018 237 150 - 450 10*9/L Final     Neutrophil Rel %   Date Value Ref Range Status   08/17/2018 63.1 35 - 80 % Final     Neutrophil %   Date Value Ref Range Status   06/12/2023 57.7 42.7 - 76.0 % Final     Lymphocyte Rel %   Date Value Ref Range Status   08/17/2018 28.3 18 - 44 % Final     Lymphocyte %   Date Value Ref Range Status   06/12/2023 34.2 19.6 - 45.3 % Final     Monocyte Rel %   Date Value Ref Range Status   08/17/2018 6.7 0 - 10 % Final     Monocyte %   Date Value Ref Range Status   06/12/2023 6.7 5.0 - 12.0 % Final     Eosinophil %   Date Value Ref Range Status   06/12/2023 0.6 0.3 - 6.2 % Final   08/17/2018 0.4 0 - 3 % Final     Basophil Rel %   Date Value Ref Range Status   08/17/2018 1.5 (H) 0 - 1 % Final     Basophil %   Date Value Ref Range Status   06/12/2023 0.5 0.0 - 1.5 % Final     Immature Grans %   Date Value Ref Range Status   06/12/2023 0.3 0.0 - 0.5 % Final     Neutrophils, Absolute   Date Value Ref Range Status   06/12/2023 6.32 1.70 - 7.00 10*3/mm3 Final     Lymphocytes Absolute   Date Value Ref Range Status   08/17/2018 3.8 0.8 - 4.8 10*9/L Final     Lymphocytes, Absolute   Date Value Ref Range Status   06/12/2023 3.76 (H) 0.70 - 3.10 10*3/mm3 Final     Monocytes Absolute   Date Value Ref Range Status   08/17/2018 0.9 0.2 - 0.9 10*9/L Final     Monocytes, Absolute   Date Value Ref Range Status   06/12/2023 0.74 0.10 - 0.90 10*3/mm3 Final     Eosinophil Abs   Date Value Ref Range Status   08/17/2018 0.1 0.0 - 0.8 10*9/L Final     Eosinophils, Absolute   Date Value Ref Range Status    06/12/2023 0.07 0.00 - 0.40 10*3/mm3 Final     Basophils Absolute   Date Value Ref Range Status   08/17/2018 0.2 (H) 0.0 - 0.1 10*9/L Final     Basophils, Absolute   Date Value Ref Range Status   06/12/2023 0.06 0.00 - 0.20 10*3/mm3 Final     Immature Grans, Absolute   Date Value Ref Range Status   06/12/2023 0.03 0.00 - 0.05 10*3/mm3 Final     nRBC   Date Value Ref Range Status   06/12/2023 0.0 0.0 - 0.2 /100 WBC Final       Lab Results   Component Value Date    GLUCOSE 90 06/12/2023    BUN 7 (L) 06/12/2023    CREATININE 0.79 06/12/2023    EGFR 82.1 06/12/2023    BCR 8.9 06/12/2023    K 4.2 06/12/2023    CO2 26.0 06/12/2023    CALCIUM 9.0 06/12/2023    ALBUMIN 4.2 06/12/2023    BILITOT 0.4 06/12/2023    AST 21 06/12/2023    ALT 14 06/12/2023         Assessment and Plan:    Obstructive sleep apnea- Established, stable   Compliant with PAP therapy- compliance report reviewed with patient   Continue PAP as prescribed  Script for PAP supplies  Pertinent labs reviewed   Drowsy driving tips- do not drive if feeling sleepy   Return to clinic in 12 months with compliance report unless change in symptoms in interim period  Restless leg syndrome/Periodic limb movement disorder - stable   PAP therapy   Non-pharmacological treatment methods             I spent 15 minutes caring for Anna on this date of service. This time includes time spent by me in the following activities: preparing for the visit, obtaining and/or reviewing a separately obtained history, performing a medically appropriate examination and/or evaluation, counseling and educating the patient/family/caregiver, ordering medications, tests, or procedures, and documenting information in the medical record. Educated on PAP management, maintenance, and compliance.           This document has been electronically signed by ALINE Lopes on August 11, 2023 10:03 CDT            CC: Dwain Reyna MD          No ref. provider found

## 2023-09-22 ENCOUNTER — OFFICE VISIT (OUTPATIENT)
Dept: ORTHOPEDIC SURGERY | Facility: CLINIC | Age: 68
End: 2023-09-22

## 2023-09-22 VITALS — BODY MASS INDEX: 27.14 KG/M2 | HEIGHT: 64 IN | WEIGHT: 159 LBS

## 2023-09-22 DIAGNOSIS — R20.2 NUMBNESS AND TINGLING OF LEFT HAND: ICD-10-CM

## 2023-09-22 DIAGNOSIS — R20.0 NUMBNESS AND TINGLING OF LEFT LOWER EXTREMITY: ICD-10-CM

## 2023-09-22 DIAGNOSIS — M25.50 PAIN IN JOINT INVOLVING MULTIPLE SITES: ICD-10-CM

## 2023-09-22 DIAGNOSIS — M47.816 FACET ARTHROPATHY, LUMBAR: ICD-10-CM

## 2023-09-22 DIAGNOSIS — M54.50 ACUTE LEFT-SIDED LOW BACK PAIN WITHOUT SCIATICA: Primary | ICD-10-CM

## 2023-09-22 DIAGNOSIS — M46.1 SI (SACROILIAC) JOINT INFLAMMATION: ICD-10-CM

## 2023-09-22 DIAGNOSIS — R20.2 NUMBNESS AND TINGLING OF LEFT LOWER EXTREMITY: ICD-10-CM

## 2023-09-22 DIAGNOSIS — R20.0 NUMBNESS AND TINGLING OF LEFT HAND: ICD-10-CM

## 2023-09-22 PROCEDURE — 1160F RVW MEDS BY RX/DR IN RCRD: CPT | Performed by: NURSE PRACTITIONER

## 2023-09-22 PROCEDURE — 99214 OFFICE O/P EST MOD 30 MIN: CPT | Performed by: NURSE PRACTITIONER

## 2023-09-22 PROCEDURE — 96372 THER/PROPH/DIAG INJ SC/IM: CPT | Performed by: NURSE PRACTITIONER

## 2023-09-22 PROCEDURE — 1159F MED LIST DOCD IN RCRD: CPT | Performed by: NURSE PRACTITIONER

## 2023-09-22 RX ORDER — KETOROLAC TROMETHAMINE 30 MG/ML
60 INJECTION, SOLUTION INTRAMUSCULAR; INTRAVENOUS ONCE
Status: COMPLETED | OUTPATIENT
Start: 2023-09-22 | End: 2023-09-22

## 2023-09-22 RX ORDER — TRIAMCINOLONE ACETONIDE 40 MG/ML
80 INJECTION, SUSPENSION INTRA-ARTICULAR; INTRAMUSCULAR ONCE
Status: COMPLETED | OUTPATIENT
Start: 2023-09-22 | End: 2023-09-22

## 2023-09-22 RX ORDER — KETOROLAC TROMETHAMINE 30 MG/ML
30 INJECTION, SOLUTION INTRAMUSCULAR; INTRAVENOUS EVERY 6 HOURS PRN
Status: DISCONTINUED | OUTPATIENT
Start: 2023-09-22 | End: 2023-09-22

## 2023-09-22 RX ORDER — TRIAMCINOLONE ACETONIDE 40 MG/ML
40 INJECTION, SUSPENSION INTRA-ARTICULAR; INTRAMUSCULAR ONCE
Status: DISCONTINUED | OUTPATIENT
Start: 2023-09-22 | End: 2023-09-22

## 2023-09-22 RX ADMIN — TRIAMCINOLONE ACETONIDE 80 MG: 40 INJECTION, SUSPENSION INTRA-ARTICULAR; INTRAMUSCULAR at 09:55

## 2023-09-22 RX ADMIN — KETOROLAC TROMETHAMINE 60 MG: 30 INJECTION, SOLUTION INTRAMUSCULAR; INTRAVENOUS at 09:56

## 2023-09-22 NOTE — PROGRESS NOTES
Anna Carvalho is a 68 y.o. female   Primary provider:  Dwain Reyna MD       Chief Complaint   Patient presents with    Lumbar Spine - Numbness, Pain, Follow-up     HISTORY OF PRESENT ILLNESS: Patient presents to office for follow-up of chronic low back pain.  Initial onset of pain occurred several months ago with no known injury or incident.  Patient was initially evaluated in office on 6/12/2023 and was complaining of diffuse joint pain in multiple areas at that time including low back pain that radiated into her bilateral hips, right knee pain and bilateral hand pain with joint swelling.  Patient was treated at that time with intramuscular injections of Toradol and Kenalog as well as a short course of Norco 5 mg and the patient experienced significant pain relief.  Patient also previously had labs performed to evaluate for possible underlying autoimmune disorders as a source of her joint pain and there was no indication at that time that she had an underlying autoimmune disorder or inflammatory based arthropathy.  Patient was last seen in office on 6/26/2023 and was doing much better at that time.    Patient returns to office today for evaluation of increased left-sided low back pain.  Patient states this pain has started in the past 4 months and gradually worsened over time.  Patient states the pain seems to be associated with lifting after she had helped her  move some wood.  Patient localizes the pain to her left lower back and left SI joint.  No radiation of the pain throughout her left leg but she does report some tingling in the lower part of her left leg.  Pain is described as constant and moderate in severity.  Pain is described as burning in nature.  Pain is worse with sitting, driving, standing, walking and activity.  Pain improves with rest and Ibuprofen.  Patient also indicates that her bilateral hand pain is starting to gradually return again as well.  Current pain scale is  6/10.    CONCURRENT MEDICAL HISTORY:    Past Medical History:   Diagnosis Date    COPD (chronic obstructive pulmonary disease)     Disease of thyroid gland     Migraine     Sleep apnea        Allergies   Allergen Reactions    Biaxin [Clarithromycin] GI Intolerance    Hydrocodone-Acetaminophen Nausea Only    Penicillins Other (See Comments)     childhood    Ceclor [Cefaclor] Rash    Keflex [Cephalexin] Rash    Other Rash     ivp dye    Reglan [Metoclopramide] Rash         Current Outpatient Medications:     albuterol sulfate HFA (Ventolin HFA) 108 (90 Base) MCG/ACT inhaler, Inhale 2 puffs Every 4 (Four) Hours As Needed for Wheezing or Shortness of Air., Disp: 18 g, Rfl: 5    budesonide-formoterol (SYMBICORT) 160-4.5 MCG/ACT inhaler, Inhale 2 puffs 2 (Two) Times a Day., Disp: 6 g, Rfl: 11    clotrimazole-betamethasone (LOTRISONE) 1-0.05 % lotion, Apply to ears twice a day as needed for itching, Disp: 30 mL, Rfl: 2    hydrOXYzine pamoate (Vistaril) 25 MG capsule, Take 1 capsule by mouth Every 6 (Six) Hours As Needed for Anxiety., Disp: 60 capsule, Rfl: 1    meclizine (ANTIVERT) 25 MG tablet, Take 1 tablet by mouth 3 (Three) Times a Day As Needed for Dizziness., Disp: 30 tablet, Rfl: 2    verapamil ER (VERELAN) 120 MG 24 hr capsule, Take 1 capsule by mouth Daily., Disp: , Rfl:     vitamin D (ERGOCALCIFEROL) 59870 units capsule capsule, Take 1 capsule by mouth Every 14 (Fourteen) Days., Disp: , Rfl:     HYDROcodone-acetaminophen (NORCO) 5-325 MG per tablet, Take 1 tablet by mouth Every 6 (Six) Hours As Needed for Moderate Pain or Severe Pain for up to 10 days., Disp: 30 tablet, Rfl: 0    Past Surgical History:   Procedure Laterality Date    APPENDECTOMY      CHOLECYSTECTOMY      COLONOSCOPY N/A 4/3/2018    Procedure: COLONOSCOPY;  Surgeon: Jean Morales DO;  Location: [facility] ENDOSCOPY;  Service: Gastroenterology    HYSTERECTOMY      SHOULDER SURGERY  2018       Family History   Adopted: Yes        Social History  "    Socioeconomic History    Marital status:    Tobacco Use    Smoking status: Every Day     Packs/day: 1.00     Years: 50.00     Pack years: 50.00     Types: Cigarettes     Start date: 1972    Smokeless tobacco: Never   Vaping Use    Vaping Use: Never used   Substance and Sexual Activity    Alcohol use: No     Comment: none in 10 years    Drug use: Never    Sexual activity: Defer        Review of Systems   Constitutional: Negative.    HENT: Negative.     Eyes: Negative.    Respiratory: Negative.     Cardiovascular: Negative.    Gastrointestinal: Negative.    Endocrine: Negative.    Genitourinary: Negative.    Musculoskeletal:  Positive for arthralgias and back pain.        Joint pain/stiffness.   Skin: Negative.    Allergic/Immunologic: Negative.    Neurological:  Positive for numbness.   Hematological:  Bruises/bleeds easily.   Psychiatric/Behavioral: Negative.       PHYSICAL EXAMINATION:       Ht 162.6 cm (64\")   Wt 72.1 kg (159 lb)   BMI 27.29 kg/m²     Physical Exam  Vitals reviewed.   Constitutional:       General: She is not in acute distress.     Appearance: She is well-developed. She is not ill-appearing.   HENT:      Head: Normocephalic.   Pulmonary:      Effort: Pulmonary effort is normal. No respiratory distress.   Abdominal:      General: There is no distension.      Palpations: Abdomen is soft.   Musculoskeletal:         General: Tenderness (Mild, left SI joint) present. No swelling, deformity or signs of injury.      Lumbar back: Negative right straight leg raise test and negative left straight leg raise test.   Skin:     General: Skin is warm and dry.      Capillary Refill: Capillary refill takes less than 2 seconds.      Findings: No erythema.   Neurological:      Mental Status: She is alert and oriented to person, place, and time.      GCS: GCS eye subscore is 4. GCS verbal subscore is 5. GCS motor subscore is 6.   Psychiatric:         Speech: Speech normal.         Behavior: Behavior " normal.         Thought Content: Thought content normal.         Judgment: Judgment normal.       GAIT:     []  Normal  [x]  Antalgic (mild)    Assistive device: [x]  None  []  Walker     []  Crutches  []  Cane     []  Wheelchair []  Stretcher    Back Exam     Tenderness   The patient is experiencing tenderness in the sacroiliac (Mild).    Tests   Straight leg raise right: negative  Straight leg raise left: negative    Other   Sensation: normal  Gait: antalgic (Mild)   Erythema: no back redness    Comments:  Mild tenderness to palpation at the left SI joint.  No focal neurological deficits noted at this time.      XR Spine Lumbar 2 or 3 View     Result Date: 6/12/2023  Narrative: INDICATION: Back pain. Normal lumbar alignment is seen. Disc space heights are fairly well preserved but there is lower lumbar facet arthropathy. SUMMARY: Lower lumbar facet arthropathy.     XR Knee 1 or 2 View Right     Result Date: 6/12/2023  Narrative: INDICATION: Pain. FINDINGS: There is mild to moderate joint space narrowing medially. Exam otherwise negative. SUMMARY: Osteoarthritis.     XR Hips Bilateral With or Without Pelvis 3-4 View     Result Date: 6/12/2023  Narrative: FINDINGS: There are symmetric features of mild degenerative change involving both hips. No other significant findings.     ASSESSMENT:    Diagnoses and all orders for this visit:    Acute left-sided low back pain without sciatica  -     triamcinolone acetonide (KENALOG-40) injection 80 mg  -     ketorolac (TORADOL) injection 60 mg  -     HYDROcodone-acetaminophen (NORCO) 5-325 MG per tablet; Take 1 tablet by mouth Every 6 (Six) Hours As Needed for Moderate Pain or Severe Pain for up to 10 days.    Pain in joint involving multiple sites  -     HYDROcodone-acetaminophen (NORCO) 5-325 MG per tablet; Take 1 tablet by mouth Every 6 (Six) Hours As Needed for Moderate Pain or Severe Pain for up to 10 days.    SI (sacroiliac) joint inflammation  -      HYDROcodone-acetaminophen (NORCO) 5-325 MG per tablet; Take 1 tablet by mouth Every 6 (Six) Hours As Needed for Moderate Pain or Severe Pain for up to 10 days.    Numbness and tingling of left hand  -     HYDROcodone-acetaminophen (NORCO) 5-325 MG per tablet; Take 1 tablet by mouth Every 6 (Six) Hours As Needed for Moderate Pain or Severe Pain for up to 10 days.    Numbness and tingling of left lower extremity  -     HYDROcodone-acetaminophen (NORCO) 5-325 MG per tablet; Take 1 tablet by mouth Every 6 (Six) Hours As Needed for Moderate Pain or Severe Pain for up to 10 days.    Facet arthropathy, lumbar  -     HYDROcodone-acetaminophen (NORCO) 5-325 MG per tablet; Take 1 tablet by mouth Every 6 (Six) Hours As Needed for Moderate Pain or Severe Pain for up to 10 days.    PLAN    Patient complains of increased left-sided low back pain in the past 4 months, which she attributes to possible lifting when she was helping her  move some wood as the pain seemed to start after that activity.  She localizes the pain to her left lower back/left SI joint and does have tenderness to palpation at the left SI joint.  We discussed the possibilities of strain of her left SI joint as well as sacroiliitis.  She is not having any true radicular pain throughout her left leg but does report some intermittent tingling in the left lower leg.  While her symptoms are not very consistent with lumbar radiculopathy, this cannot be completely excluded.  We discussed the possibility of MRI imaging of her lumbar spine and/or left hip for further evaluation if needed.  For now, patient wants to wait on MRI imaging and continue with conservative management.  Recommend proceeding today with intramuscular injections of Toradol 60 mg and Kenalog 80 mg for management of pain/inflammation as these did offer good pain relief in June 2023 of her joint pain.  At that time, she was complaining of joint pain in multiple areas and she does state today  that her bilateral hand pain is starting to gradually worsen again as well.    As previously noted, the patient had labs performed to evaluate for underlying autoimmune disorders or inflammatory based arthropathies, which were unremarkable at that time.  We had previously discussed that the labs are not definitive for diagnosis or exclusion of these disorders but do help to guide diagnosis in conjunction with the patient's clinical history. Specifically, the patient had normal inflammatory markers. She had a negative RA factor and a negative HLA-B27. Additionally, JAZMIN was negative. She did have slight elevation in her uric acid level (6.0). Also, she had a markedly low vitamin D level at 11.2.  The patient has a history of vitamin D deficiency and previously reported that she had not been taking her high-dose vitamin D supplement every 2 weeks.  She was instructed to start taking her vitamin D supplement to help correct the vitamin D deficiency so hopefully that is improving.    Patient could likely benefit from a referral to physical therapy.  We discussed stretching exercises for her left SI joint and I demonstrated some of these in office today.  If she is not improving with treatments initiated today, then I would recommend a referral to physical therapy.    Patient may could benefit from trial of a prescription oral NSAID for her diffuse joint pain in multiple areas.  However, she has been having issues with excessive bruising in her upper extremities for unknown reasons.  She is not on any anticoagulants and does not take aspirin therapy.  Due to this, I think it is best to avoid oral NSAIDs at this time as they may increase her risk for bleeding and worsen her current symptoms.    Norco 5 mg is refilled for the patient today to continue to take as needed for moderate to severe pain that is uncontrolled with nonopioid pain management methods.  She previously tolerated this medication well with no known adverse  effects and she previously took it very sparingly.  Patient is reminded to take the least amount of pain medication needed to control her pain and to focus on nonopioid pain management methods is much as possible.  JESSICA is reviewed internally via Epic and is noted to be appropriate.  Recommend Tylenol as needed for milder pain.    Follow-up in 2 weeks for recheck as needed for any new, worsening or persistent symptoms.  Plan for referral to physical therapy at that time if her pain persists in the left SI joint.  Consider MRI imaging of the lumbar spine and/or left hip if her pain persists or worsens.  If the patient improves and is doing much better with treatment initiated today, she can follow-up on an as-needed basis.    Time spent of a minimum of 30 minutes including the face to face evaluation, reviewing of medical history and prior medial records, reviewing of diagnostic studies, prescription drug management, documentation, patient education and coordination of care.      EMR Dragon/AllSource Analysisiption Disclaimer: Some of this note may be an electronic transcription/translation of spoken language to printed text using the Dragon Dictation System.      Return in about 2 weeks (around 10/6/2023), or if symptoms worsen or fail to improve, for Recheck.       This document has been electronically signed by ALINE Mo on September 25, 2023 12:47 CDT       ALINE Mo

## 2023-09-25 RX ORDER — HYDROCODONE BITARTRATE AND ACETAMINOPHEN 5; 325 MG/1; MG/1
1 TABLET ORAL EVERY 6 HOURS PRN
Qty: 30 TABLET | Refills: 0 | Status: SHIPPED | OUTPATIENT
Start: 2023-09-25 | End: 2023-10-05

## 2025-06-20 NOTE — PROGRESS NOTES
Anna Carvalho is a 68 y.o. female returns for     Chief Complaint   Patient presents with   • Osteoporosis   • Joint Pain     HISTORY OF PRESENT ILLNESS: 68-year-old  female patient [facility] for [facility] health evaluation and possible treatment of osteopenia.    Last Bone Density Study: 4/17/2023    Referred by: ALINE Altamirano (podiatry)    History of Osteoporosis or Osteopenia: No.  Osteopenia is a new diagnosis based on her recent DEXA scan, which is her initial/baseline study  Prior Treatments for Osteoporosis: None    Decrease in Height: Patient is unsure.    Fracture History: Left distal fibula fracture March 2023 due to an inversion injury while walking, treated nonsurgically.    High Risk Medications:   Current: Symbicort   Previous: Symbicort, Celexa, methylprednisone    Comorbid Medical Conditions that Increase Risk for Osteoporosis: COPD, long-term use of inhaled steroids (Symbicort), long-term use of tobacco (50 years), thyroid disease, vitamin D deficiency    Postmenopausal status: Postmenopausal due to hysterectomy in 1998  Age of Menopause/Hysterectomy: Age 43  Hormone Replacement Therapy: Yes, states she took HRT for approximately 10 years.    Family History of Osteoporosis: Unknown-patient is adopted.     History of Smoking: Yes, current smoker of 1 pack/day for the past 50 years.    Taking Calcium supplements: No  Taking Vitamin D supplements: Yes, taking high-dose vitamin D supplement of 50,000 units every 2 weeks.  Patient has a history of vitamin D deficiency, although no vitamin D levels are on file for review.    While the patient was scheduled to be seen today for a bone health evaluation and osteopenia, she is much more focused on her complaints of severe and diffuse joint pain in multiple areas.  Specifically, patient complains of pain across her low back that radiates into her bilateral hips, right knee pain and bilateral hand pain with joint  "swelling.  This has been an ongoing issue for several months and she complained of joint pain to her primary care physician back in January 2023 who ordered some labs to check for rheumatoid arthritis and gave her an intramuscular injection of steroid at that time.  However, she states that her pain has significantly worsened in the past 2 to 3 months for reasons that are unknown.  She states that her rings are very tight due to the swelling in her hands/fingers.  Patient states that the pain in her joints is affecting her quality of life and limiting her daily activities.  She has increased pain in the low back and bilateral hips with walking and activity, but also states that her pain is present at rest (5/10 even while sitting/at rest).  X-rays are obtained today in office of the lumbar spine, bilateral hips/pelvis and right knee.    CONCURRENT MEDICAL HISTORY:    The following portions of the patient's history were reviewed and updated as appropriate: allergies, current medications, past family history, past medical history, past social history, past surgical history and problem list.     ROS  No fevers or chills.  No chest pain or shortness of air.  No GI or  disturbances.  Low back pain.  Bilateral hip pain.  Right knee pain.  Bilateral hand pain/swelling.  Multiple joint pain    PHYSICAL EXAMINATION:       Ht 162.6 cm (64\")   Wt 76.2 kg (168 lb)   BMI 28.84 kg/m²     Physical Exam  Vitals reviewed.   Constitutional:       General: She is not in acute distress.     Appearance: She is well-developed. She is not ill-appearing.   HENT:      Head: Normocephalic.   Pulmonary:      Effort: Pulmonary effort is normal. No respiratory distress.   Abdominal:      General: There is no distension.      Palpations: Abdomen is soft.   Musculoskeletal:         General: Swelling (Bilateral hands/finger joints) and tenderness (Mild, bilateral hands, lumbar spine) present. No deformity or signs of injury.      Lumbar back: " Negative right straight leg raise test and negative left straight leg raise test.   Skin:     General: Skin is warm and dry.      Capillary Refill: Capillary refill takes less than 2 seconds.      Findings: No erythema.   Neurological:      Mental Status: She is alert and oriented to person, place, and time.      GCS: GCS eye subscore is 4. GCS verbal subscore is 5. GCS motor subscore is 6.   Psychiatric:         Speech: Speech normal.         Behavior: Behavior normal.         Thought Content: Thought content normal.         Judgment: Judgment normal.         GAIT:     []  Normal  [x]  Antalgic (mild)    Assistive device: [x]  None  []  Walker     []  Crutches  []  Cane     []  Wheelchair  []  Stretcher    Back Exam     Tenderness   The patient is experiencing tenderness in the lumbar and sacroiliac (Mild).    Tests   Straight leg raise right: negative  Straight leg raise left: negative    Other   Sensation: normal  Gait: antalgic (Mild)     Comments:  No kyphosis or deformity noted.  No focal neurological deficits noted.            XR Spine Lumbar 2 or 3 View    Result Date: 6/12/2023  Narrative: INDICATION: Back pain. Normal lumbar alignment is seen. Disc space heights are fairly well preserved but there is lower lumbar facet arthropathy. SUMMARY: Lower lumbar facet arthropathy.    XR Knee 1 or 2 View Right    Result Date: 6/12/2023  Narrative: INDICATION: Pain. FINDINGS: There is mild to moderate joint space narrowing medially. Exam otherwise negative. SUMMARY: Osteoarthritis.    XR Hips Bilateral With or Without Pelvis 3-4 View    Result Date: 6/12/2023  Narrative: FINDINGS: There are symmetric features of mild degenerative change involving both hips. No other significant findings.    Study Result    Dual-Energy X-ray Absorptiometry (DEXA)     INDICATION:  Bone mineral density screening.     COMPARISON:  None relevant.     FINDINGS:  PA LUMBAR SPINE:  BMD is 0.854 g/cm2 as determined from L1 through  L4.  T-score is -1.8  (standard deviations of young adult mean).  Z-score is 0.1 (standard deviations of age-matched mean).     LEFT PROXIMAL FEMUR:  BMD is 0.601 g/cm2 as determined from the left femoral neck.  T-score is -2.2  (standard deviations of young adult mean).  Z-score is -0.6 (standard deviations of age-matched mean).     RIGHT PROXIMAL FEMUR:  BMD is 0.596 g/cm2 as determined from the right femoral neck.  T-score is -2.3  (standard deviations of young adult mean).  Z-score is -0.7 (standard deviations of age-matched mean).     Utilizing FRAX, patient's ten-year risk of major osteoporotic fracture is 13%  and risk for a hip fracture is 3.8%.     IMPRESSION:  Osteopenia.        Consider FDA-approved medical therapies in postmenopausal women and men aged 50  years and older, based on the following:  -A hip or vertebral (clinical or morphometric) fracture.  -T-score less than or equal to -2.5 at the femoral neck or spine after  appropriate evaluation to exclude secondary causes.  -Low bone mass (T-score between -1.0 and -2.5 at the femoral neck or spine)  and a 10 year probability of a hip fracture of greater than or equal to 3% or a  10 year probability of a major osteoporosis-related fracture greater than or  equal to 20% based on the [place]-adapted WHO algorithm.  -Clinicians judgement and/or patient preferences may indicate treatment for  people with 10 year fracture probabilities above or below these levels.     Link for fracture risk calculator:  https://www.shef.ac.uk/FRAX/     The DEXA scan was performed using a [employer] C unit.         ASSESSMENT:    Diagnoses and all orders for this visit:    Chronic bilateral low back pain without sciatica  -     XR Spine Lumbar 2 or 3 View; Future  -     triamcinolone acetonide (KENALOG-40) injection 80 mg  -     ketorolac (TORADOL) injection 60 mg  -     HYDROcodone-acetaminophen (NORCO) 5-325 MG per tablet; Take 1 tablet by mouth Every 8 (Eight) Hours As  Needed for Moderate Pain or Severe Pain for up to 10 days.    Bilateral hip pain  -     XR Hips Bilateral With or Without Pelvis 3-4 View; Future  -     triamcinolone acetonide (KENALOG-40) injection 80 mg  -     ketorolac (TORADOL) injection 60 mg  -     HYDROcodone-acetaminophen (NORCO) 5-325 MG per tablet; Take 1 tablet by mouth Every 8 (Eight) Hours As Needed for Moderate Pain or Severe Pain for up to 10 days.    Chronic pain of right knee  -     XR Knee 1 or 2 View Right; Future  -     triamcinolone acetonide (KENALOG-40) injection 80 mg  -     ketorolac (TORADOL) injection 60 mg  -     HYDROcodone-acetaminophen (NORCO) 5-325 MG per tablet; Take 1 tablet by mouth Every 8 (Eight) Hours As Needed for Moderate Pain or Severe Pain for up to 10 days.    Pain in joint involving multiple sites  -     Uric Acid; Future  -     JAZMIN by IFA, Reflex to Titer and Pattern; Future  -     Rheumatoid Factor; Future  -     Sedimentation Rate; Future  -     CBC & Differential; Future  -     Comprehensive Metabolic Panel; Future  -     C-reactive Protein; Future  -     HLA-B27 Antigen; Future  -     triamcinolone acetonide (KENALOG-40) injection 80 mg  -     ketorolac (TORADOL) injection 60 mg  -     HYDROcodone-acetaminophen (NORCO) 5-325 MG per tablet; Take 1 tablet by mouth Every 8 (Eight) Hours As Needed for Moderate Pain or Severe Pain for up to 10 days.    Moderate COPD (chronic obstructive pulmonary disease)  -     triamcinolone acetonide (KENALOG-40) injection 80 mg  -     ketorolac (TORADOL) injection 60 mg    Tobacco dependence  -     triamcinolone acetonide (KENALOG-40) injection 80 mg  -     ketorolac (TORADOL) injection 60 mg    Cigarette smoker  -     triamcinolone acetonide (KENALOG-40) injection 80 mg  -     ketorolac (TORADOL) injection 60 mg    Osteopenia of multiple sites  -     triamcinolone acetonide (KENALOG-40) injection 80 mg  -     ketorolac (TORADOL) injection 60 mg    Postmenopausal  -     triamcinolone  acetonide (KENALOG-40) injection 80 mg  -     ketorolac (TORADOL) injection 60 mg    Long term (current) use of inhaled steroids  -     triamcinolone acetonide (KENALOG-40) injection 80 mg  -     ketorolac (TORADOL) injection 60 mg    Irritable bowel syndrome, unspecified type  -     triamcinolone acetonide (KENALOG-40) injection 80 mg  -     ketorolac (TORADOL) injection 60 mg    Early menopause occurring in patient age younger than 45 years  -     triamcinolone acetonide (KENALOG-40) injection 80 mg  -     ketorolac (TORADOL) injection 60 mg    Vitamin D deficiency  -     Vitamin D,25-Hydroxy; Future  -     triamcinolone acetonide (KENALOG-40) injection 80 mg  -     ketorolac (TORADOL) injection 60 mg    PLAN    Bone density study results from 4/17/2023 are reviewed and discussed with patient today.  We discussed that she has a current T score of -1.8 in the lumbar spine, indicative of osteopenia.  We discussed that she has a current T score of -2.2 in the left femoral neck and a T score of -2.3 in the right femoral neck, indicative of more advanced osteopenia.  We discussed her diagnosis of osteopenia, nearing osteoporosis.  We discussed that she is high risk for developing osteoporosis as well as high risk for fracture.  Patient's risk factors for osteoporosis include her age, race, gender, postmenopausal status, early menopause at the age of 43 due to hysterectomy, long-term use of inhaled steroids for control of COPD (Symbicort), long-term use of tobacco (50 years) and comorbid medical conditions including COPD and vitamin D deficiency.     As previously noted, the patient has sustained a recent left distal fibula fracture when she inverted her ankle while walking in March 2023, which was treated nonsurgically.  She has no history of any other fractures.  We briefly discussed the options of focusing on healthy lifestyle choices and promoting good bone health versus proceeding with starting osteoporosis  medication to prevent the inevitable onset of osteoporosis as she does have advanced osteopenia.  We could consider starting Fosamax or possibly Prolia (if her insurance will approve this without an osteoporosis diagnosis).  We did not have much opportunity to fully discuss treatment of her osteopenia/osteoporosis as she is much more focused on her joint pain, which she states is severe.  We did discuss and have recommended that she start taking a calcium plus vitamin D supplement, such as Citracal or Caltrate, which can be purchased over-the-counter.  The patient is already taking a high-dose vitamin D supplementation of 50,000 units every 2 weeks for treatment of vitamin D deficiency, although there are no previous vitamin D levels on file for review.  The patient will need an updated vitamin D level, which I will order today.  Recommend to try and stay as active as possible and perform some consistent weightbearing exercise, such as walking, which will help to keep her bones and muscle strong and prevent worsening osteoporosis.  Patient should also consider smoking cessation as she is a longtime smoker of 1 pack/day for the past 50 years and smoking will certainly worsen osteoporosis.  Again, we had limited time to discuss smoking cessation today as the majority of the visit was focused on her joint pain.    Patient complains of chronic, progressively worsening joint pain in multiple areas as described in the HPI above.  She has tried to address this with her primary care physician, who did give her an intramuscular injection of steroid in January 2023 and ordered some labs.  I have reviewed those labs but it appears that she only had some basic labs with an additional rheumatoid factor, which was negative.  Patient states that her chronic joint pain in multiple areas has become severe and has progressively worsened in the past 3 to 4 months for reasons that are unknown. Patient also complains of significant  fatigue.  We discussed the possibility of other inflammatory based arthropathies and other autoimmune disorders that could cause joint pain.  X-rays of the bilateral hips/pelvis, lumbar spine and right knee are performed in office today and reviewed with no acute findings noted.  Patient is noted to have some mild to moderate degenerative changes in both hip joints but overall continues to have joint spacing and smooth-appearing femoral heads.  Patient is noted to have some mild facet arthropathy affecting the lower lumbar spine but otherwise her spine appears well-aligned and the spacing is well-maintained.  Patient is noted to have some mild degenerative changes in the right knee joint with mild joint space narrowing in the medial and patellofemoral compartments but overall no significant degenerative changes are noted in any of the x-rays performed today.  Recommend proceeding with more extensive labs to screen for possible underlying autoimmune disorders or inflammatory based arthropathies, although these would not be definitive for diagnosis but they may help to guide further treatment recommendations.  Patient may need a referral to rheumatology but we will wait on the labs to see if any of the lab results are indicative of this.    Recommend proceeding today with intramuscular injections of Toradol 60 mg and Kenalog 80 mg for management of joint pain/inflammation/swelling.     Norco 5 mg is prescribed to take as needed for moderate to severe pain that is uncontrolled with nonopioid pain management methods. Patient is instructed to take the pain medication sparingly and to take the least amount of pain medication needed to control the pain.  Patient is cautioned that opioids can be addictive.  We discussed other potential adverse side effects of opioids including constipation, dizziness, drowsiness, increased risk for falls and/or respiratory depression.  Patient verbalized understanding of these risks.  I  have encouraged the patient to focus on nonopioid pain management methods as much as possible.  JESSICA is reviewed internally via Epic and is noted to be appropriate.    Follow-up in 2 weeks for recheck.  Consider MRI imaging of the lumbar spine/pelvis if indicated.  Consider rheumatology referral if indicated.  Consider referral to physical therapy.  In regards to her bone health, plan to further discuss starting osteoporosis medications as her T-scores indicate advanced osteopenia, nearing osteoporosis.    Time spent of a minimum of 30 minutes including the face to face evaluation, reviewing of medical history and prior medial records, reviewing of diagnostic studies, ordering additional tests, prescription drug management, documentation, patient education and coordination of care.     EMR Dragon/Transciption Disclaimer: Some of this note may be an electronic transcription/translation of spoken language to printed text using the Dragon Dictation System.     Return in about 2 weeks (around 6/26/2023) for Recheck.        This document has been electronically signed by ALINE Mo on June 12, 2023 13:20 CDT      ALINE Mo    19

## 2025-06-23 NOTE — PROGRESS NOTES
Problem: Pain  Goal: Acceptable pain level achieved/maintained at rest using appropriate pain scale for the patient  Outcome: Monitoring/Evaluating progress  Goal: Acceptable pain level achieved/maintained with activity using appropriate pain scale for the patient  Outcome: Monitoring/Evaluating progress  Goal: Acceptable pain level achieved/maintained without oversedation  Outcome: Monitoring/Evaluating progress     Problem: At Risk for Falls  Goal: Patient does not fall  Outcome: Monitoring/Evaluating progress  Goal: Patient takes action to control fall-related risks  Outcome: Monitoring/Evaluating progress     Problem: At Risk for Injury Due to Fall  Goal: Patient does not fall  Outcome: Monitoring/Evaluating progress  Goal: Takes action to control condition specific risks  Outcome: Monitoring/Evaluating progress  Goal: Verbalizes understanding of fall-related injury personal risks  Description: Document education using the patient education activity  Outcome: Monitoring/Evaluating progress     Problem: Skin Integrity Alteration  Goal: Skin remains intact with no new/deterioration of wound or pressure injury  Outcome: Monitoring/Evaluating progress  Goal: Participates in wound care activities  Outcome: Monitoring/Evaluating progress     Problem: GI Bleed  Goal: S/S of GI bleeding reduced/controlled  Outcome: Monitoring/Evaluating progress  Goal: Verbalizes understanding of s/s of GI bleeding and treatment  Description: Document education using the patient education activity.   Outcome: Monitoring/Evaluating progress     Problem: Postoperative Care  Goal: Vital signs are maintained within parameters  Outcome: Monitoring/Evaluating progress  Goal: Elimination status is maintained/returned to baseline  Outcome: Monitoring/Evaluating progress  Goal: Oral intake is resumed and tolerated  Outcome: Monitoring/Evaluating progress  Goal: Activity level is resumed to level needed for d/c  Outcome: Monitoring/Evaluating  Subjective   Anna Carvalho is a 65 y.o. female.     History of Present Illness   Patient states she has had trouble with her ears for years.  In the last year symptoms seem to be worsening.  Her ears itch and she has scaly skin particularly on the left side.  Nothing in particular brings this on.  She also experiences spinning dizziness.  The dizziness will last from hours to up to a day.  Nothing in particular brings them on.  Associated symptoms include nausea.  They can come without any motion.  Her last dizzy spell was about 2 weeks ago.  She says about a month ago her hearing abruptly decreased on the left.  She says it has improved some since then.  No otorrhea, no previous otologic surgery.  Does have a history of basal migraines.      The following portions of the patient's history were reviewed and updated as appropriate: allergies, current medications, past family history, past medical history, past social history, past surgical history and problem list.      Anna Carvalho reports that she has been smoking cigarettes. She has been smoking about 1.00 pack per day. She has never used smokeless tobacco. She reports that she does not drink alcohol or use drugs.  Patient is a tobacco user and has been counseled for use of tobacco products    No family history on file.    Allergies   Allergen Reactions   • Biaxin [Clarithromycin] GI Intolerance   • Penicillins Other (See Comments)     childhood   • Ceclor [Cefaclor] Rash   • Keflex [Cephalexin] Rash   • Other Rash     ivp dye   • Reglan [Metoclopramide] Rash         Current Outpatient Medications:   •  budesonide-formoterol (SYMBICORT) 160-4.5 MCG/ACT inhaler, Inhale 2 puffs 2 (Two) Times a Day., Disp: , Rfl:   •  hydrOXYzine pamoate (Vistaril) 25 MG capsule, Take 1 capsule by mouth Every 6 (Six) Hours As Needed for Anxiety., Disp: 60 capsule, Rfl: 1  •  naproxen (NAPROSYN) 500 MG tablet, Take 1 tablet by mouth 2 (Two) Times a Day As Needed for  Mild Pain  or Moderate Pain ., Disp: 15 tablet, Rfl: 0  •  verapamil (CALAN) 120 MG tablet, Take 120 mg by mouth Daily., Disp: , Rfl:   •  vitamin D (ERGOCALCIFEROL) 08621 units capsule capsule, Take 50,000 Units by mouth Every 14 (Fourteen) Days., Disp: , Rfl:   •  clotrimazole-betamethasone (LOTRISONE) 1-0.05 % lotion, Apply to ears twice a day as needed for itching, Disp: 30 mL, Rfl: 2  •  meclizine (ANTIVERT) 25 MG tablet, Take 1 tablet by mouth 3 (Three) Times a Day As Needed for Dizziness., Disp: 30 tablet, Rfl: 2    Past Medical History:   Diagnosis Date   • COPD (chronic obstructive pulmonary disease) (CMS/HCC)    • Disease of thyroid gland    • Migraine    • Sleep apnea        Past Surgical History:   Procedure Laterality Date   • APPENDECTOMY     • CHOLECYSTECTOMY     • COLONOSCOPY N/A 4/3/2018    Procedure: COLONOSCOPY;  Surgeon: Jean Morales DO;  Location: [facility] ENDOSCOPY;  Service: Gastroenterology   • HYSTERECTOMY     • SHOULDER SURGERY  2018         Review of Systems   HENT: Positive for ear discharge, ear pain and hearing loss.    Eyes: Positive for visual disturbance.   Gastrointestinal: Positive for nausea.   Musculoskeletal: Positive for arthralgias.   Neurological: Positive for dizziness and headaches.   Hematological: Bruises/bleeds easily.   All other systems reviewed and are negative.          Objective   Physical Exam  General: Well-developed well-nourished female in no acute distress.  Alert and oriented x-3. Head: Normocephalic. Face: Symmetrical strength and appearance. PERRL. EOMI. Voice:Strong. Speech:Fluent  Ears: External ears no deformity, right ear canal shows minimal scaly skin.  Tympanic membrane intact clear mobile.  Left ear shows a large amount of uninfected flaky squamous debris along with erythema that is cleaned under the microscope.  Beyond this tympanic membrane is intact and clear nose: Nares show no discharge mass polyp or purulence.  Boggy mucosa is present.  No  progress  Goal: Verbalizes understanding of postoperative care in the hospital and after d/c  Description: Document on Patient Education Activity  Outcome: Monitoring/Evaluating progress     Problem: Impaired Physical Mobility  Goal: Functional status is maintained or returned to baseline during hospitalization  Outcome: Monitoring/Evaluating progress  Goal: Tolerates activity for discharge setting with no abnormal symptoms  Outcome: Monitoring/Evaluating progress     Problem: Delirium, Risk for  Goal: # No symptoms of delirium  Description: Evaluate delirium symptoms under active problem when present  Outcome: Monitoring/Evaluating progress  Goal: # Verbalizes understanding of delirium preventive strategies  Description: Document on Patient Education Activity   Outcome: Monitoring/Evaluating progress      gross external deformity.  Septum: Midline  Oral cavity: Lips and gums without lesions.  Tongue and floor of mouth without lesions.  Parotid and submandibular ducts unobstructed.  No mucosal lesions on the buccal mucosa or vestibule of the mouth.  Pharynx: No erythema exudate mass or ulcer  Neck: No lymphadenopathy.  No thyromegaly.  Trachea and larynx midline.  No masses in the parotid or submandibular glands.  Audiogram is obtained and reviewed and shows a bilateral symmetrical mild high-frequency sensorineural hearing loss.  Tympanograms are type a bilaterally.  Discrimination scores are 100% bilaterally.      Assessment/Plan   Anna was seen today for ear problem.    Diagnoses and all orders for this visit:    Vestibular migraine    Chronic non-infective otitis externa of both ears, unspecified type    Sensorineural hearing loss of both ears    Other orders  -     clotrimazole-betamethasone (LOTRISONE) 1-0.05 % lotion; Apply to ears twice a day as needed for itching  -     meclizine (ANTIVERT) 25 MG tablet; Take 1 tablet by mouth 3 (Three) Times a Day As Needed for Dizziness.        Plan: Ear cleaned as described above.  Explained to the patient that I suspect her dizzy spells are vestibular migraines as they do not fit any of the other classic vertiginous syndromes.  Will prescribe Lotrisone lotion to be used twice a day as needed for her itchy ears and she has taken meclizine in the past when she has her dizzy spells and I will write her a new prescription for this to use as needed.  I also reassured her that her hearing was symmetrical and there did not appear to be any acute change in her hearing on the left compared to the right.  Return in 3 months, call sooner for problems.

## (undated) DEVICE — SINGLE-USE BIOPSY FORCEPS: Brand: RADIAL JAW 4

## (undated) DEVICE — CANN SMPL SOFTECH BIFLO ETCO2 A/M 7FT